# Patient Record
Sex: MALE | Race: ASIAN | NOT HISPANIC OR LATINO | Employment: FULL TIME | URBAN - METROPOLITAN AREA
[De-identification: names, ages, dates, MRNs, and addresses within clinical notes are randomized per-mention and may not be internally consistent; named-entity substitution may affect disease eponyms.]

---

## 2017-11-22 ENCOUNTER — OFFICE VISIT (OUTPATIENT)
Dept: INTERNAL MEDICINE | Facility: MEDICAL CENTER | Age: 27
End: 2017-11-22
Payer: COMMERCIAL

## 2017-11-22 VITALS
OXYGEN SATURATION: 96 % | HEART RATE: 72 BPM | SYSTOLIC BLOOD PRESSURE: 135 MMHG | BODY MASS INDEX: 21.94 KG/M2 | WEIGHT: 162 LBS | HEIGHT: 72 IN | DIASTOLIC BLOOD PRESSURE: 77 MMHG | TEMPERATURE: 98.4 F | RESPIRATION RATE: 16 BRPM

## 2017-11-22 DIAGNOSIS — Z00.00 HEALTHCARE MAINTENANCE: ICD-10-CM

## 2017-11-22 DIAGNOSIS — M94.0 COSTOCHONDRITIS: ICD-10-CM

## 2017-11-22 PROCEDURE — 99204 OFFICE O/P NEW MOD 45 MIN: CPT | Mod: GC | Performed by: INTERNAL MEDICINE

## 2017-11-22 ASSESSMENT — PATIENT HEALTH QUESTIONNAIRE - PHQ9: CLINICAL INTERPRETATION OF PHQ2 SCORE: 0

## 2017-11-22 NOTE — PATIENT INSTRUCTIONS
- please start using the gel topically   - please use a heat packs     Costochondritis  Costochondritis, sometimes called Tietze syndrome, is a swelling and irritation (inflammation) of the tissue (cartilage) that connects your ribs with your breastbone (sternum). It causes pain in the chest and rib area. Costochondritis usually goes away on its own over time. It can take up to 6 weeks or longer to get better, especially if you are unable to limit your activities.  CAUSES   Some cases of costochondritis have no known cause. Possible causes include:  · Injury (trauma).  · Exercise or activity such as lifting.  · Severe coughing.  SIGNS AND SYMPTOMS  · Pain and tenderness in the chest and rib area.  · Pain that gets worse when coughing or taking deep breaths.  · Pain that gets worse with specific movements.  DIAGNOSIS   Your health care provider will do a physical exam and ask about your symptoms. Chest X-rays or other tests may be done to rule out other problems.  TREATMENT   Costochondritis usually goes away on its own over time. Your health care provider may prescribe medicine to help relieve pain.  HOME CARE INSTRUCTIONS   · Avoid exhausting physical activity. Try not to strain your ribs during normal activity. This would include any activities using chest, abdominal, and side muscles, especially if heavy weights are used.  · Apply ice to the affected area for the first 2 days after the pain begins.  ¨ Put ice in a plastic bag.  ¨ Place a towel between your skin and the bag.  ¨ Leave the ice on for 20 minutes, 2-3 times a day.  · Only take over-the-counter or prescription medicines as directed by your health care provider.  SEEK MEDICAL CARE IF:  · You have redness or swelling at the rib joints. These are signs of infection.  · Your pain does not go away despite rest or medicine.  SEEK IMMEDIATE MEDICAL CARE IF:   · Your pain increases or you are very uncomfortable.  · You have shortness of breath or difficulty  breathing.  · You cough up blood.  · You have worse chest pains, sweating, or vomiting.  · You have a fever or persistent symptoms for more than 2-3 days.  · You have a fever and your symptoms suddenly get worse.  MAKE SURE YOU:   · Understand these instructions.  · Will watch your condition.  · Will get help right away if you are not doing well or get worse.     This information is not intended to replace advice given to you by your health care provider. Make sure you discuss any questions you have with your health care provider.     Document Released: 09/27/2006 Document Revised: 10/08/2014 Document Reviewed: 07/22/2014  [employer] Interactive Patient Education ©2016 [employer]

## 2017-11-22 NOTE — PROGRESS NOTES
New Patient to Establish    Reason to establish: New patient to establish    CC: New patient establishment and chest pain    HPI: Mr. Pelayo is a pleasant 27 years old male with no past medical history presented to the clinic today to establish care as a new patient and complaining of chest pain for 3 months, pain felt as a sharp pain, over the central chest, increased with movement and physical activity ( patient plays soccer, basketball and wrestling), relieved with naproxen and rest, reproducible by pressure over the left side of the chest and the specific movements of the left arm, he was seen by an orthopedic doctor in [facility], had a normal chest x-ray, was prescribed naproxen 500 mg twice daily and advised to rest, patient pain resolved until 2 weeks ago when he resumed his physical activity.    Screening:  - HIV screening:  Was never done    - flu vaccine: not given   - Tdap; 3 years ago   - Basic Labs within the last 12 months:  Not on file   - Vit D: not on file          There are no active problems to display for this patient.      History reviewed. No pertinent past medical history.    Current Outpatient Prescriptions   Medication Sig Dispense Refill   • diclofenac (SOLARAZE) 3 % gel Apply to the chest at areas of pain 2 times daily 50 g 3     No current facility-administered medications for this visit.        Allergies as of 11/22/2017   • (No Known Allergies)       Social History     Social History   • Marital status: Single     Spouse name: N/A   • Number of children: N/A   • Years of education: N/A     Occupational History   • Not on file.     Social History Main Topics   • Smoking status: Never Smoker   • Smokeless tobacco: Never Used   • Alcohol use No   • Drug use: No   • Sexual activity: Not on file     Other Topics Concern   • Not on file     Social History Narrative   • No narrative on file       History reviewed. No pertinent family history.    History reviewed. No pertinent surgical  "history.    ROS: As per HPI. Additional pertinent symptoms as noted below.  Constitutional: Negative for fever, chills, weight loss, malaise/fatigue and diaphoresis.   HENT: Negative for ear discharge, ear pain, hearing loss and tinnitus.    Eyes: Negative for blurred vision, double vision, pain, discharge and redness.   Respiratory: Negative for cough, hemoptysis, sputum production, shortness of breath and wheezing.    Cardiovascular: Positive for chest pain, Negative for palpitations, orthopnea, leg swelling and PND.   Gastrointestinal: Negative for heartburn, nausea, vomiting, abdominal pain, diarrhea, constipation and blood in stool.   Genitourinary: Negative for dysuria, urgency, frequency, hematuria and flank pain.   Musculoskeletal: Negative for myalgias, back pain, joint pain and neck pain.   Skin: Negative for itching and rash.   Neurological: Negative for dizziness, tingling, tremors, sensory change, focal weakness, loss of consciousness, weakness and headaches.   Psychiatric/Behavioral: Negative for depression and suicidal ideas. The patient is not nervous/anxious and does not have insomnia.        /77   Pulse 72   Temp 36.9 °C (98.4 °F)   Resp 16   Ht 1.83 m (6' 0.05\")   Wt 73.5 kg (162 lb)   SpO2 96%   BMI 21.94 kg/m²     Physical Exam  Constitutional: Oriented to person, place, and time and well-developed, well-nourished, and in no distress. Vital signs are normal.   HENT:   Head: Normocephalic and atraumatic.   Mouth/Throat: Oropharynx is clear and moist.   Eyes: Conjunctivae are normal.   Neck: Neck supple. No JVD present. No tracheal deviation present.   Cardiovascular: Normal rate.  Exam reveals no gallop and no friction rub.    No murmur heard.  Pulmonary/Chest: Effort normal and breath sounds normal. No respiratory distress. he has no wheezes. he has no rales. he exhibits tenderness over the sternum and Lt costochondral joints.   Abdominal: Soft. Bowel sounds are normal. he exhibits " no mass. There is no tenderness. There is no rebound and no guarding.   Musculoskeletal: Normal range of motion. he exhibits no edema.   Lymphadenopathy:     he has no cervical adenopathy.   Neurological: he is alert and oriented to person, place, and time. he has normal strength and normal reflexes. Gait normal. GCS score is 15.   Skin: No rash noted. No erythema. No pallor.       Assessment and Plan    1. Healthcare maintenance  New patient presented to the clinic to establish care, patient was not following With a PCP.  - Colon cancer colonoscopy screening: Recommended that the age of 50 years old  - Prostate cancer screening: We'll discuss with the patient when he is 51 years old  - Lung cancer screening: non smoker no screening indicated  - AAA screening:non smoker no screening indicated  - HIV screening: ordered this visit    - flu vaccine: patient refused   - Tdap: due in 2024  - PNA vac: Normal risk recommended at 65 years old  - Basic Labs within the last 12 months: ordered today   - Vit D: ordered today   - Shingles vac: Recommended at 50 years old        2. Costochondritis  27 years old male complaining of chest pain for 3 months, pain felt as a sharp pain, over the central chest, increased with movement and physical activity ( patient plays soccer, basketball and wrestling), relieved with naproxen and rest, reproducible by pressure over the left side of the chest and the specific movements of the left arm.   On examination he exhibits tenderness over the sternum and Lt costochondral joints.   Plan:   - Diclofenac gel BID topical   - request medical records from [facility]  -Follow up in 3 weeks    Followup: Return in about 3 weeks (around 12/13/2017).       Signed by: Nat Pritchett M.D.

## 2017-11-27 ENCOUNTER — HOSPITAL ENCOUNTER (OUTPATIENT)
Dept: LAB | Facility: MEDICAL CENTER | Age: 27
End: 2017-11-27
Attending: STUDENT IN AN ORGANIZED HEALTH CARE EDUCATION/TRAINING PROGRAM
Payer: COMMERCIAL

## 2017-11-27 DIAGNOSIS — Z00.00 HEALTHCARE MAINTENANCE: ICD-10-CM

## 2017-11-27 LAB
25(OH)D3 SERPL-MCNC: 22 NG/ML (ref 30–100)
ALBUMIN SERPL BCP-MCNC: 4.6 G/DL (ref 3.2–4.9)
ALBUMIN/GLOB SERPL: 1.8 G/DL
ALP SERPL-CCNC: 49 U/L (ref 30–99)
ALT SERPL-CCNC: 14 U/L (ref 2–50)
ANION GAP SERPL CALC-SCNC: 6 MMOL/L (ref 0–11.9)
AST SERPL-CCNC: 18 U/L (ref 12–45)
BASOPHILS # BLD AUTO: 0.9 % (ref 0–1.8)
BASOPHILS # BLD: 0.04 K/UL (ref 0–0.12)
BILIRUB SERPL-MCNC: 1.1 MG/DL (ref 0.1–1.5)
BUN SERPL-MCNC: 13 MG/DL (ref 8–22)
CALCIUM SERPL-MCNC: 9.8 MG/DL (ref 8.5–10.5)
CHLORIDE SERPL-SCNC: 104 MMOL/L (ref 96–112)
CHOLEST SERPL-MCNC: 151 MG/DL (ref 100–199)
CO2 SERPL-SCNC: 28 MMOL/L (ref 20–33)
CREAT SERPL-MCNC: 1.01 MG/DL (ref 0.5–1.4)
EOSINOPHIL # BLD AUTO: 0.07 K/UL (ref 0–0.51)
EOSINOPHIL NFR BLD: 1.6 % (ref 0–6.9)
ERYTHROCYTE [DISTWIDTH] IN BLOOD BY AUTOMATED COUNT: 39 FL (ref 35.9–50)
GFR SERPL CREATININE-BSD FRML MDRD: >60 ML/MIN/1.73 M 2
GLOBULIN SER CALC-MCNC: 2.6 G/DL (ref 1.9–3.5)
GLUCOSE SERPL-MCNC: 82 MG/DL (ref 65–99)
HCT VFR BLD AUTO: 45.3 % (ref 42–52)
HDLC SERPL-MCNC: 42 MG/DL
HGB BLD-MCNC: 15.7 G/DL (ref 14–18)
HIV 1+2 AB+HIV1 P24 AG SERPL QL IA: NON REACTIVE
IMM GRANULOCYTES # BLD AUTO: 0 K/UL (ref 0–0.11)
IMM GRANULOCYTES NFR BLD AUTO: 0 % (ref 0–0.9)
LDLC SERPL CALC-MCNC: 97 MG/DL
LYMPHOCYTES # BLD AUTO: 1.93 K/UL (ref 1–4.8)
LYMPHOCYTES NFR BLD: 43.2 % (ref 22–41)
MCH RBC QN AUTO: 30.1 PG (ref 27–33)
MCHC RBC AUTO-ENTMCNC: 34.7 G/DL (ref 33.7–35.3)
MCV RBC AUTO: 86.8 FL (ref 81.4–97.8)
MONOCYTES # BLD AUTO: 0.4 K/UL (ref 0–0.85)
MONOCYTES NFR BLD AUTO: 8.9 % (ref 0–13.4)
NEUTROPHILS # BLD AUTO: 2.03 K/UL (ref 1.82–7.42)
NEUTROPHILS NFR BLD: 45.4 % (ref 44–72)
NRBC # BLD AUTO: 0 K/UL
NRBC BLD AUTO-RTO: 0 /100 WBC
PLATELET # BLD AUTO: 202 K/UL (ref 164–446)
PMV BLD AUTO: 10.5 FL (ref 9–12.9)
POTASSIUM SERPL-SCNC: 4 MMOL/L (ref 3.6–5.5)
PROT SERPL-MCNC: 7.2 G/DL (ref 6–8.2)
RBC # BLD AUTO: 5.22 M/UL (ref 4.7–6.1)
SODIUM SERPL-SCNC: 138 MMOL/L (ref 135–145)
TRIGL SERPL-MCNC: 61 MG/DL (ref 0–149)
TSH SERPL DL<=0.005 MIU/L-ACNC: 1.27 UIU/ML (ref 0.3–3.7)
WBC # BLD AUTO: 4.5 K/UL (ref 4.8–10.8)

## 2017-11-27 PROCEDURE — 83036 HEMOGLOBIN GLYCOSYLATED A1C: CPT

## 2017-11-27 PROCEDURE — 80061 LIPID PANEL: CPT

## 2017-11-27 PROCEDURE — 82306 VITAMIN D 25 HYDROXY: CPT

## 2017-11-27 PROCEDURE — 85025 COMPLETE CBC W/AUTO DIFF WBC: CPT

## 2017-11-27 PROCEDURE — 87389 HIV-1 AG W/HIV-1&-2 AB AG IA: CPT

## 2017-11-27 PROCEDURE — 36415 COLL VENOUS BLD VENIPUNCTURE: CPT

## 2017-11-27 PROCEDURE — 84443 ASSAY THYROID STIM HORMONE: CPT

## 2017-11-27 PROCEDURE — 80053 COMPREHEN METABOLIC PANEL: CPT

## 2017-11-28 DIAGNOSIS — E55.9 VITAMIN D DEFICIENCY: ICD-10-CM

## 2017-11-28 LAB
EST. AVERAGE GLUCOSE BLD GHB EST-MCNC: 108 MG/DL
HBA1C MFR BLD: 5.4 % (ref 0–5.6)

## 2017-11-28 RX ORDER — ERGOCALCIFEROL 1.25 MG/1
50000 CAPSULE ORAL
Qty: 16 CAP | Refills: 0 | Status: SHIPPED | OUTPATIENT
Start: 2017-11-28 | End: 2020-01-27

## 2017-12-04 ENCOUNTER — TELEPHONE (OUTPATIENT)
Dept: INTERNAL MEDICINE | Facility: MEDICAL CENTER | Age: 27
End: 2017-12-04

## 2017-12-04 DIAGNOSIS — M54.5 LOW BACK PAIN, UNSPECIFIED BACK PAIN LATERALITY, UNSPECIFIED CHRONICITY, WITH SCIATICA PRESENCE UNSPECIFIED: ICD-10-CM

## 2017-12-04 NOTE — TELEPHONE ENCOUNTER
Patient requires PA for the diclofenac gel but I see a 3 % gel was prescribed. Can you prescribe a 1% gel? Usually the 3% gel is way more expensive and doesn't get covered by insurances. Prescribe a lower percentage would give a chance for patient's to get an approval.

## 2017-12-04 NOTE — TELEPHONE ENCOUNTER
----- Message from Rin Cesar sent at 12/1/2017  4:26 PM PST -----  Regarding: Dr Kimble/prior auth for rx  Contact: [phone]  Patient needs a prior auth diclofenacor  3% gel

## 2017-12-12 RX ORDER — MELOXICAM 7.5 MG/1
7.5 TABLET ORAL 2 TIMES DAILY PRN
Qty: 60 TAB | Refills: 2 | Status: SHIPPED | OUTPATIENT
Start: 2017-12-12 | End: 2018-01-11

## 2017-12-12 NOTE — TELEPHONE ENCOUNTER
Called pharmacy to verify if meloxicam was covered insurance will only pay for pt to take 1 tab daily verbal ok from  to change to a 15mg tab take 1/2 tab po twice daily gave verbal to pharmacist please change in pt's chart      Pt notified of new medication sent to pharmacy

## 2017-12-12 NOTE — TELEPHONE ENCOUNTER
Pt calling to check on the status of diclofenac has been waiting for 3 weeks. In order for diclofenac to be covered pt needs to try and fail 2 alternatives meloxicam,naproxen or piroxicam please send rx to pharmacy if appropriate or call pt to discuss

## 2018-01-15 ENCOUNTER — OFFICE VISIT (OUTPATIENT)
Dept: INTERNAL MEDICINE | Facility: MEDICAL CENTER | Age: 28
End: 2018-01-15
Payer: COMMERCIAL

## 2018-01-15 VITALS
OXYGEN SATURATION: 97 % | HEART RATE: 70 BPM | BODY MASS INDEX: 21.75 KG/M2 | DIASTOLIC BLOOD PRESSURE: 79 MMHG | HEIGHT: 72 IN | WEIGHT: 160.6 LBS | SYSTOLIC BLOOD PRESSURE: 122 MMHG | TEMPERATURE: 98.2 F

## 2018-01-15 DIAGNOSIS — R07.89 COSTOCHONDRAL CHEST PAIN: ICD-10-CM

## 2018-01-15 PROCEDURE — 99214 OFFICE O/P EST MOD 30 MIN: CPT | Mod: GC | Performed by: INTERNAL MEDICINE

## 2018-01-15 RX ORDER — NAPROXEN 500 MG/1
500 TABLET ORAL 2 TIMES DAILY WITH MEALS
Qty: 60 TAB | Refills: 0 | Status: SHIPPED | OUTPATIENT
Start: 2018-01-15 | End: 2020-01-27

## 2018-01-15 ASSESSMENT — PAIN SCALES - GENERAL: PAINLEVEL: 7=MODERATE-SEVERE PAIN

## 2018-01-15 NOTE — LETTER
[facility]  Nat Pritchett M.D.  [address]  [place] [address]  Fax: [phone]   Authorization for Release/Disclosure of   Protected Health Information   Name: MELINA PELAYO : 1990 SSN: [id number]   Address: [address]  [address]  [place] [address] Phone:    [phone] (home)    I authorize the entity listed below to release/disclose the PHI below to:   [facility]/Nat Pritchett M.D. and Nat Pritchett M.D.   Provider or Entity Name:     Address   City, State, Zip   Phone:      Fax:     Reason for request: continuity of care   Information to be released:    [  ] LAST COLONOSCOPY,  including any PATH REPORT and follow-up  [  ] LAST FIT/COLOGUARD RESULT [  ] LAST DEXA  [  ] LAST MAMMOGRAM  [  ] LAST PAP  [  ] LAST LABS [  ] RETINA EXAM REPORT  [  ] IMMUNIZATION RECORDS  [  ] Release all info      [  ] Check here and initial the line next to each item to release ALL health information INCLUDING  _____ Care and treatment for drug and / or alcohol abuse  _____ HIV testing, infection status, or AIDS  _____ Genetic Testing    DATES OF SERVICE OR TIME PERIOD TO BE DISCLOSED: _____________  I understand and acknowledge that:  * This Authorization may be revoked at any time by you in writing, except if your health information has already been used or disclosed.  * Your health information that will be used or disclosed as a result of you signing this authorization could be re-disclosed by the recipient. If this occurs, your re-disclosed health information may no longer be protected by State or Federal laws.  * You may refuse to sign this Authorization. Your refusal will not affect your ability to obtain treatment.  * This Authorization becomes effective upon signing and will  on (date) __________.      If no date is indicated, this Authorization will  one (1) year from the signature date.    Name: Melina Pelayo    Signature:   Date:     1/15/2018       PLEASE  FAX REQUESTED RECORDS BACK TO: [phone]

## 2018-01-15 NOTE — PROGRESS NOTES
Established Patient    Trung presents today with the following:    CC: Follow-up chest pain    HPI: Mr. Stack is less than 27 years old male with medical history of vitamin D deficiency presented to the clinic today to follow up for his chest pain, chest pain started about 4 months ago, pain was sharp, over the central chest, increased with movement and physical activity, reproducible by pressure over the left side of the chest and the specific movements of the left arm, patient was prescribed olfen gel, he used us for 4 weeks, he report improvement in pain, he does not feel the sharp pain when he sneezes anymore but he still has the pain and its being more as low-grade constant pain.     Patient Active Problem List    Diagnosis Date Noted   • Vitamin D deficiency 11/28/2017       Current Outpatient Prescriptions   Medication Sig Dispense Refill   • naproxen (NAPROSYN) 500 MG Tab Take 1 Tab by mouth 2 times a day, with meals. 60 Tab 0   • vitamin D, Ergocalciferol, (DRISDOL) 41555 units Cap capsule Take 1 Cap by mouth every 7 days. 16 Cap 0     No current facility-administered medications for this visit.        ROS: As per HPI. Additional pertinent symptoms as noted below.  Constitutional: Negative for fever, chills, weight loss, malaise/fatigue and diaphoresis.   HENT: Negative for ear discharge, ear pain, hearing loss and tinnitus.    Eyes: Negative for blurred vision, double vision, pain, discharge and redness.   Respiratory: Negative for cough, hemoptysis, sputum production, shortness of breath and wheezing.    Cardiovascular: Negative for palpitations, orthopnea, leg swelling and PND.  positive for chest pain  Gastrointestinal: Negative for heartburn, nausea, vomiting, abdominal pain, diarrhea, constipation and blood in stool.   Genitourinary: Negative for dysuria, urgency, frequency, hematuria and flank pain.   Musculoskeletal: Negative for myalgias, back pain, joint pain and neck pain.   Skin: Negative for  itching and rash.   Neurological: Negative for dizziness, tingling, tremors, sensory change, focal weakness, loss of consciousness, weakness and headaches.   Psychiatric/Behavioral: Negative for depression and suicidal ideas. The patient is not nervous/anxious and does not have insomnia.        /79   Pulse 70   Temp 36.8 °C (98.2 °F)   Ht 1.829 m (6')   Wt 72.8 kg (160 lb 9.6 oz)   SpO2 97%   BMI 21.78 kg/m²     Physical Exam   Constitutional: Oriented to person, place, and time and well-developed, well-nourished, and in no distress. Vital signs are normal.   HENT:   Head: Normocephalic and atraumatic.   Mouth/Throat: Oropharynx is clear and moist.   Eyes: Conjunctivae are normal.   Neck: Neck supple. No JVD present. No tracheal deviation present.   Cardiovascular: Normal rate.  Exam reveals no gallop and no friction rub.    No murmur heard.  Pulmonary/Chest: Effort normal and breath sounds normal. No respiratory distress. he has no wheezes. he has no rales. he exhibits tenderness over the upper costovertebral joints, more on the right side.   Abdominal: Soft. Bowel sounds are normal. he exhibits no mass. There is no tenderness. There is no rebound and no guarding.   Musculoskeletal: Normal range of motion. he exhibits no edema.   Lymphadenopathy:     he has no cervical adenopathy.   Neurological: he is alert and oriented to person, place, and time. he has normal strength . Gait normal.    Skin: No rash noted. No erythema. No pallor.   Assessment and Plan    1. Costochondral chest pain  27 years old male with central chest pain for 4 months, pain was sharp, over the central chest, increased with movement and physical activity, reproducible by pressure over the left side of the chest and the specific movements of the left arm, patient was prescribed olfen gel, he used us for 4 weeks, he report improvement in pain, he does not feel the sharp pain when he neezes but he still has the pain and its being more  as low-grade constant pain.   - He will seen by [facility] in September, had a normal chest x-ray  Plan:   - Start naproxen 500 mg twice a day   - Follow-up in 3 weeks  - Will assess the patient's response to systemic NSAIDs, patient will most likely costochondritis given his physical examination and partial response to topical Olfen gel, he has minor pectus deformity which might be the reason for the pain.      Signed by: Nat Pritchett M.D.

## 2018-01-15 NOTE — PATIENT INSTRUCTIONS
- please start taking naproxen   - follow up in 3 weeks   - start taking vit D     Costochondritis  Costochondritis, sometimes called Tietze syndrome, is a swelling and irritation (inflammation) of the tissue (cartilage) that connects your ribs with your breastbone (sternum). It causes pain in the chest and rib area. Costochondritis usually goes away on its own over time. It can take up to 6 weeks or longer to get better, especially if you are unable to limit your activities.  CAUSES   Some cases of costochondritis have no known cause. Possible causes include:  · Injury (trauma).  · Exercise or activity such as lifting.  · Severe coughing.  SIGNS AND SYMPTOMS  · Pain and tenderness in the chest and rib area.  · Pain that gets worse when coughing or taking deep breaths.  · Pain that gets worse with specific movements.  DIAGNOSIS   Your health care provider will do a physical exam and ask about your symptoms. Chest X-rays or other tests may be done to rule out other problems.  TREATMENT   Costochondritis usually goes away on its own over time. Your health care provider may prescribe medicine to help relieve pain.  HOME CARE INSTRUCTIONS   · Avoid exhausting physical activity. Try not to strain your ribs during normal activity. This would include any activities using chest, abdominal, and side muscles, especially if heavy weights are used.  · Apply ice to the affected area for the first 2 days after the pain begins.  ¨ Put ice in a plastic bag.  ¨ Place a towel between your skin and the bag.  ¨ Leave the ice on for 20 minutes, 2-3 times a day.  · Only take over-the-counter or prescription medicines as directed by your health care provider.  SEEK MEDICAL CARE IF:  · You have redness or swelling at the rib joints. These are signs of infection.  · Your pain does not go away despite rest or medicine.  SEEK IMMEDIATE MEDICAL CARE IF:   · Your pain increases or you are very uncomfortable.  · You have shortness of breath or  difficulty breathing.  · You cough up blood.  · You have worse chest pains, sweating, or vomiting.  · You have a fever or persistent symptoms for more than 2-3 days.  · You have a fever and your symptoms suddenly get worse.  MAKE SURE YOU:   · Understand these instructions.  · Will watch your condition.  · Will get help right away if you are not doing well or get worse.     This information is not intended to replace advice given to you by your health care provider. Make sure you discuss any questions you have with your health care provider.     Document Released: 09/27/2006 Document Revised: 10/08/2014 Document Reviewed: 07/22/2014  [employer] Interactive Patient Education ©2016 [employer]

## 2019-12-11 ENCOUNTER — HOSPITAL ENCOUNTER (OUTPATIENT)
Dept: LAB | Facility: MEDICAL CENTER | Age: 29
End: 2019-12-11
Attending: STUDENT IN AN ORGANIZED HEALTH CARE EDUCATION/TRAINING PROGRAM
Payer: COMMERCIAL

## 2019-12-11 LAB
ALBUMIN SERPL BCP-MCNC: 4.7 G/DL (ref 3.2–4.9)
ALBUMIN/GLOB SERPL: 2 G/DL
ALP SERPL-CCNC: 50 U/L (ref 30–99)
ALT SERPL-CCNC: 15 U/L (ref 2–50)
ANION GAP SERPL CALC-SCNC: 8 MMOL/L (ref 0–11.9)
AST SERPL-CCNC: 20 U/L (ref 12–45)
BASOPHILS # BLD AUTO: 1 % (ref 0–1.8)
BASOPHILS # BLD: 0.04 K/UL (ref 0–0.12)
BILIRUB SERPL-MCNC: 0.7 MG/DL (ref 0.1–1.5)
BUN SERPL-MCNC: 9 MG/DL (ref 8–22)
CALCIUM SERPL-MCNC: 8.9 MG/DL (ref 8.5–10.5)
CHLORIDE SERPL-SCNC: 102 MMOL/L (ref 96–112)
CHOLEST SERPL-MCNC: 134 MG/DL (ref 100–199)
CO2 SERPL-SCNC: 29 MMOL/L (ref 20–33)
CREAT SERPL-MCNC: 1.23 MG/DL (ref 0.5–1.4)
EOSINOPHIL # BLD AUTO: 0.06 K/UL (ref 0–0.51)
EOSINOPHIL NFR BLD: 1.4 % (ref 0–6.9)
ERYTHROCYTE [DISTWIDTH] IN BLOOD BY AUTOMATED COUNT: 41.4 FL (ref 35.9–50)
FASTING STATUS PATIENT QL REPORTED: NORMAL
GLOBULIN SER CALC-MCNC: 2.3 G/DL (ref 1.9–3.5)
GLUCOSE SERPL-MCNC: 92 MG/DL (ref 65–99)
HCT VFR BLD AUTO: 48 % (ref 42–52)
HDLC SERPL-MCNC: 42 MG/DL
HGB BLD-MCNC: 15.7 G/DL (ref 14–18)
IMM GRANULOCYTES # BLD AUTO: 0.01 K/UL (ref 0–0.11)
IMM GRANULOCYTES NFR BLD AUTO: 0.2 % (ref 0–0.9)
LDLC SERPL CALC-MCNC: 76 MG/DL
LYMPHOCYTES # BLD AUTO: 1.83 K/UL (ref 1–4.8)
LYMPHOCYTES NFR BLD: 43.8 % (ref 22–41)
MCH RBC QN AUTO: 30 PG (ref 27–33)
MCHC RBC AUTO-ENTMCNC: 32.7 G/DL (ref 33.7–35.3)
MCV RBC AUTO: 91.6 FL (ref 81.4–97.8)
MONOCYTES # BLD AUTO: 0.45 K/UL (ref 0–0.85)
MONOCYTES NFR BLD AUTO: 10.8 % (ref 0–13.4)
NEUTROPHILS # BLD AUTO: 1.79 K/UL (ref 1.82–7.42)
NEUTROPHILS NFR BLD: 42.8 % (ref 44–72)
NRBC # BLD AUTO: 0 K/UL
NRBC BLD-RTO: 0 /100 WBC
PLATELET # BLD AUTO: 198 K/UL (ref 164–446)
PMV BLD AUTO: 10.4 FL (ref 9–12.9)
POTASSIUM SERPL-SCNC: 4.2 MMOL/L (ref 3.6–5.5)
PROT SERPL-MCNC: 7 G/DL (ref 6–8.2)
RBC # BLD AUTO: 5.24 M/UL (ref 4.7–6.1)
SODIUM SERPL-SCNC: 139 MMOL/L (ref 135–145)
TRIGL SERPL-MCNC: 82 MG/DL (ref 0–149)
WBC # BLD AUTO: 4.2 K/UL (ref 4.8–10.8)

## 2019-12-11 PROCEDURE — 80053 COMPREHEN METABOLIC PANEL: CPT

## 2019-12-11 PROCEDURE — 36415 COLL VENOUS BLD VENIPUNCTURE: CPT

## 2019-12-11 PROCEDURE — 83036 HEMOGLOBIN GLYCOSYLATED A1C: CPT

## 2019-12-11 PROCEDURE — 80061 LIPID PANEL: CPT

## 2019-12-11 PROCEDURE — 85025 COMPLETE CBC W/AUTO DIFF WBC: CPT

## 2019-12-12 LAB
EST. AVERAGE GLUCOSE BLD GHB EST-MCNC: 111 MG/DL
HBA1C MFR BLD: 5.5 % (ref 0–5.6)

## 2020-01-27 ENCOUNTER — OFFICE VISIT (OUTPATIENT)
Dept: URGENT CARE | Facility: MEDICAL CENTER | Age: 30
End: 2020-01-27
Payer: COMMERCIAL

## 2020-01-27 ENCOUNTER — HOSPITAL ENCOUNTER (OUTPATIENT)
Facility: MEDICAL CENTER | Age: 30
End: 2020-01-27
Attending: NURSE PRACTITIONER
Payer: COMMERCIAL

## 2020-01-27 VITALS
TEMPERATURE: 99.3 F | OXYGEN SATURATION: 98 % | RESPIRATION RATE: 16 BRPM | HEIGHT: 73 IN | SYSTOLIC BLOOD PRESSURE: 136 MMHG | BODY MASS INDEX: 23.19 KG/M2 | WEIGHT: 175 LBS | HEART RATE: 86 BPM | DIASTOLIC BLOOD PRESSURE: 72 MMHG

## 2020-01-27 DIAGNOSIS — N50.89 SCROTUM SWELLING: ICD-10-CM

## 2020-01-27 DIAGNOSIS — Z20.2 EXPOSURE TO STD: ICD-10-CM

## 2020-01-27 DIAGNOSIS — Z11.3 SCREEN FOR STD (SEXUALLY TRANSMITTED DISEASE): ICD-10-CM

## 2020-01-27 DIAGNOSIS — N45.1 EPIDIDYMITIS: ICD-10-CM

## 2020-01-27 LAB
APPEARANCE UR: CLEAR
BILIRUB UR STRIP-MCNC: NEGATIVE MG/DL
COLOR UR AUTO: YELLOW
GLUCOSE UR STRIP.AUTO-MCNC: NEGATIVE MG/DL
KETONES UR STRIP.AUTO-MCNC: NEGATIVE MG/DL
LEUKOCYTE ESTERASE UR QL STRIP.AUTO: NORMAL
NITRITE UR QL STRIP.AUTO: NEGATIVE
PH UR STRIP.AUTO: 6 [PH] (ref 5–8)
PROT UR QL STRIP: NEGATIVE MG/DL
RBC UR QL AUTO: NORMAL
SP GR UR STRIP.AUTO: 1.01
UROBILINOGEN UR STRIP-MCNC: 0.2 MG/DL

## 2020-01-27 PROCEDURE — 87491 CHLMYD TRACH DNA AMP PROBE: CPT

## 2020-01-27 PROCEDURE — 87591 N.GONORRHOEAE DNA AMP PROB: CPT

## 2020-01-27 PROCEDURE — 81002 URINALYSIS NONAUTO W/O SCOPE: CPT | Performed by: NURSE PRACTITIONER

## 2020-01-27 PROCEDURE — 99214 OFFICE O/P EST MOD 30 MIN: CPT | Performed by: NURSE PRACTITIONER

## 2020-01-27 RX ORDER — AZITHROMYCIN 500 MG/1
1000 TABLET, FILM COATED ORAL ONCE
Qty: 2 TAB | Refills: 0 | Status: SHIPPED | OUTPATIENT
Start: 2020-01-27 | End: 2020-01-27

## 2020-01-27 RX ORDER — DOXYCYCLINE HYCLATE 100 MG/1
100 CAPSULE ORAL 2 TIMES DAILY
Qty: 20 CAP | Refills: 0 | Status: SHIPPED | OUTPATIENT
Start: 2020-01-27 | End: 2020-02-06

## 2020-01-27 ASSESSMENT — ENCOUNTER SYMPTOMS
FEVER: 0
VOMITING: 0
CHILLS: 0
NAUSEA: 0
CONSTIPATION: 0
HEADACHES: 0
ABDOMINAL PAIN: 0
DIARRHEA: 0
TINGLING: 0

## 2020-01-27 ASSESSMENT — LIFESTYLE VARIABLES: SUBSTANCE_ABUSE: 0

## 2020-01-28 ENCOUNTER — HOSPITAL ENCOUNTER (OUTPATIENT)
Dept: RADIOLOGY | Facility: MEDICAL CENTER | Age: 30
End: 2020-01-28
Attending: NURSE PRACTITIONER
Payer: COMMERCIAL

## 2020-01-28 ENCOUNTER — TELEPHONE (OUTPATIENT)
Dept: URGENT CARE | Facility: PHYSICIAN GROUP | Age: 30
End: 2020-01-28

## 2020-01-28 DIAGNOSIS — Z20.2 EXPOSURE TO STD: ICD-10-CM

## 2020-01-28 DIAGNOSIS — N45.1 EPIDIDYMITIS: ICD-10-CM

## 2020-01-28 DIAGNOSIS — Z11.3 SCREEN FOR STD (SEXUALLY TRANSMITTED DISEASE): ICD-10-CM

## 2020-01-28 DIAGNOSIS — N50.89 SCROTUM SWELLING: ICD-10-CM

## 2020-01-28 LAB
C TRACH DNA SPEC QL NAA+PROBE: POSITIVE
N GONORRHOEA DNA SPEC QL NAA+PROBE: NEGATIVE
SPECIMEN SOURCE: ABNORMAL

## 2020-01-28 PROCEDURE — 76870 US EXAM SCROTUM: CPT

## 2020-01-28 ASSESSMENT — ENCOUNTER SYMPTOMS
FLANK PAIN: 0
BACK PAIN: 0

## 2020-01-28 NOTE — PROGRESS NOTES
"Subjective:      Trung Pelayo is a 29 y.o. male who presents with Exposure to STD (swelling in genital area this morning, exposure to herpes and chlamydia)    Reviewed past medical, surgical and family history. Reviewed prescription and OTC medications with patient in electronic health record today      No Known Allergies          Exposure to STD   This is a new problem. The current episode started in the past 7 days. The problem occurs constantly. Associated symptoms include urinary symptoms. Pertinent negatives include no abdominal pain, chills, fever, headaches, nausea, rash or vomiting. Diaphoresis:    Associated symptoms comments: Left testicular pain and swelling  . Nothing aggravates the symptoms. He has tried acetaminophen for the symptoms. The treatment provided mild relief.       Review of Systems   Constitutional: Negative for chills and fever. Diaphoresis:      Gastrointestinal: Negative for abdominal pain, constipation, diarrhea, nausea and vomiting.   Genitourinary: Negative for dysuria, flank pain, frequency, hematuria and urgency.   Musculoskeletal: Negative for back pain.   Skin: Negative for rash.   Neurological: Negative for tingling and headaches.   Psychiatric/Behavioral: Negative for substance abuse.          Objective:     /72   Pulse 86   Temp 37.4 °C (99.3 °F) (Temporal)   Resp 16   Ht 1.854 m (6' 1\")   Wt 79.4 kg (175 lb)   SpO2 98%   BMI 23.09 kg/m²      Physical Exam  Vitals signs and nursing note reviewed.   Constitutional:       Appearance: Normal appearance. He is well-developed.   HENT:      Head: Normocephalic.   Cardiovascular:      Rate and Rhythm: Normal rate.      Pulses: Normal pulses.   Pulmonary:      Effort: Pulmonary effort is normal.   Abdominal:      Palpations: Abdomen is not rigid.   Genitourinary:     Penis: Normal and uncircumcised.       Scrotum/Testes:         Left: Tenderness, swelling, testicular hydrocele or varicocele not present.      Epididymis: "      Left: Enlarged. Not inflamed. Tenderness present. No mass.   Skin:     General: Skin is warm and dry.   Neurological:      Mental Status: He is alert and oriented to person, place, and time.   Psychiatric:         Mood and Affect: Mood normal.         Behavior: Behavior normal.         Thought Content: Thought content normal.                 Assessment/Plan:     1. Screen for STD (sexually transmitted disease)  POCT Urinalysis    [id number]    CHLAMYDIA/GC PCR URINE OR SWAB   2. Epididymitis  doxycycline (VIBRAMYCIN) 100 MG Cap    [id number]   3. Exposure to STD  azithromycin (ZITHROMAX) 500 MG tablet    cefTRIAXone (ROCEPHIN) 250 mg, lidocaine (XYLOCAINE) 1 % 0.9 mL for IM use    [id number]    CHLAMYDIA/GC PCR URINE OR SWAB   4. Scrotum swelling  [id number]           [phone]    US: pending - will be scheduled tomorrow. No s/sx of torsion at this time. Pt educated if pain gets worse to go to ER. Verbalizes understanding.     Educated in proper administration of medication(s) ordered today including safety, possible SE, risks, benefits, rationale and alternatives to therapy.     Educated in infection control practices.  100% condom use.   Abstain from IC for 7- 10 days.     Return to urgent care clinic or PCP  5-7 days if current symptoms are not resolving in a satisfactory manner or sooner if new or worsening symptoms occur. Differential diagnosis, natural history, supportive care, and indications for immediate follow-up discussed at length.   Advised of signs and symptoms which would warrant further evaluation and /or emergent evaluation in ER.    Verbalized agreement with this treatment plan and seemed to understand without barriers. Questions were encouraged and answered to satisfaction.

## 2020-01-29 ENCOUNTER — TELEPHONE (OUTPATIENT)
Dept: URGENT CARE | Facility: CLINIC | Age: 30
End: 2020-01-29

## 2020-01-30 ENCOUNTER — HOSPITAL ENCOUNTER (OUTPATIENT)
Dept: LAB | Facility: MEDICAL CENTER | Age: 30
End: 2020-01-30
Attending: PHYSICIAN ASSISTANT
Payer: COMMERCIAL

## 2020-01-30 ENCOUNTER — OFFICE VISIT (OUTPATIENT)
Dept: URGENT CARE | Facility: MEDICAL CENTER | Age: 30
End: 2020-01-30
Payer: COMMERCIAL

## 2020-01-30 ENCOUNTER — TELEPHONE (OUTPATIENT)
Dept: URGENT CARE | Facility: PHYSICIAN GROUP | Age: 30
End: 2020-01-30

## 2020-01-30 VITALS
BODY MASS INDEX: 22.69 KG/M2 | OXYGEN SATURATION: 98 % | HEART RATE: 71 BPM | WEIGHT: 172 LBS | TEMPERATURE: 98.3 F | SYSTOLIC BLOOD PRESSURE: 100 MMHG | DIASTOLIC BLOOD PRESSURE: 70 MMHG

## 2020-01-30 DIAGNOSIS — A74.9 CHLAMYDIA: ICD-10-CM

## 2020-01-30 DIAGNOSIS — Z11.3 SCREENING FOR STD (SEXUALLY TRANSMITTED DISEASE): ICD-10-CM

## 2020-01-30 LAB
HAV IGM SERPL QL IA: NEGATIVE
HBV CORE IGM SER QL: NEGATIVE
HBV SURFACE AG SER QL: NEGATIVE
HCV AB SER QL: NEGATIVE
HIV 1+2 AB+HIV1 P24 AG SERPL QL IA: NON REACTIVE
TREPONEMA PALLIDUM IGG+IGM AB [PRESENCE] IN SERUM OR PLASMA BY IMMUNOASSAY: NON REACTIVE

## 2020-01-30 PROCEDURE — 99213 OFFICE O/P EST LOW 20 MIN: CPT | Performed by: PHYSICIAN ASSISTANT

## 2020-01-30 PROCEDURE — 87491 CHLMYD TRACH DNA AMP PROBE: CPT

## 2020-01-30 PROCEDURE — 36415 COLL VENOUS BLD VENIPUNCTURE: CPT

## 2020-01-30 PROCEDURE — 86694 HERPES SIMPLEX NES ANTBDY: CPT

## 2020-01-30 PROCEDURE — 87591 N.GONORRHOEAE DNA AMP PROB: CPT

## 2020-01-30 PROCEDURE — 86780 TREPONEMA PALLIDUM: CPT

## 2020-01-30 PROCEDURE — 80074 ACUTE HEPATITIS PANEL: CPT

## 2020-01-30 PROCEDURE — 87389 HIV-1 AG W/HIV-1&-2 AB AG IA: CPT

## 2020-01-31 ASSESSMENT — ENCOUNTER SYMPTOMS
VOMITING: 0
SHORTNESS OF BREATH: 0
FLANK PAIN: 0
CHILLS: 0
DIZZINESS: 0
DIARRHEA: 0
ABDOMINAL PAIN: 0
FEVER: 0
NAUSEA: 0
MUSCULOSKELETAL NEGATIVE: 1

## 2020-01-31 NOTE — PROGRESS NOTES
Subjective:      Trung Pelayo is a 29 y.o. male who presents with Sexually Transmitted Diseases (wants blood work)            HPI  Patient presents to urgent care requesting a full STD screening panel. He was seen in urgent care 2 days ago tested positive for Chlamydia. He has already received treatment. He states his girlfriend recently tested positive for chlamydia and herpes. He denies any genital rashes, joint pain, or urinary symptoms. He has no known medical problems and doesn't take any regular medications.     Review of Systems   Constitutional: Negative for chills and fever.   HENT: Negative for congestion.    Respiratory: Negative for shortness of breath.    Cardiovascular: Negative for chest pain.   Gastrointestinal: Negative for abdominal pain, diarrhea, nausea and vomiting.   Genitourinary: Negative for dysuria, flank pain, frequency, hematuria and urgency.   Musculoskeletal: Negative.    Skin: Negative for rash.   Neurological: Negative for dizziness.        Objective:     /70 (BP Location: Left arm, Patient Position: Sitting, BP Cuff Size: Adult)   Pulse 71   Temp 36.8 °C (98.3 °F) (Temporal)   Wt 78 kg (172 lb)   SpO2 98%   BMI 22.69 kg/m²      Physical Exam  Vitals signs and nursing note reviewed.   Constitutional:       Appearance: He is well-developed.   HENT:      Head: Normocephalic and atraumatic.      Nose: Nose normal.      Mouth/Throat:      Mouth: Mucous membranes are moist.   Eyes:      Pupils: Pupils are equal, round, and reactive to light.   Neck:      Musculoskeletal: Normal range of motion.   Cardiovascular:      Rate and Rhythm: Normal rate and regular rhythm.   Pulmonary:      Effort: Pulmonary effort is normal.   Genitourinary:     Comments: Exam deferred   Musculoskeletal: Normal range of motion.   Skin:     General: Skin is warm and dry.   Neurological:      Mental Status: He is alert and oriented to person, place, and time.   Psychiatric:         Behavior: Behavior  normal.               PMH:  has no past medical history on file.  MEDS:   Current Outpatient Medications:   •  doxycycline (VIBRAMYCIN) 100 MG Cap, Take 1 Cap by mouth 2 times a day for 10 days., Disp: 20 Cap, Rfl: 0  ALLERGIES: No Known Allergies  SURGHX: History reviewed. No pertinent surgical history.  SOCHX:  reports that he has never smoked. He has never used smokeless tobacco. He reports that he does not drink alcohol or use drugs.  FH: family history is not on file.    Assessment/Plan:       1. Screening for STD (sexually transmitted disease)  - HIV AG/AB COMBO ASSAY SCREENING; Future  - TREPONEMA PALLIDUM ANTIBODIES  - HEPATITIS PANEL ACUTE(4 COMPONENTS); Future  - HSV 1/2 IGM    Patient will have blood work drawn for STD testing, pending results. Advised to avoid all sexual contact for 2-3 weeks. The patient demonstrated a good understanding and agreed with the treatment plan.

## 2020-02-02 LAB — HSV1+2 IGM SER IA-ACNC: 1.01 IV

## 2020-02-04 ENCOUNTER — TELEPHONE (OUTPATIENT)
Dept: URGENT CARE | Facility: CLINIC | Age: 30
End: 2020-02-04

## 2020-02-05 NOTE — TELEPHONE ENCOUNTER
Spoke to patient and informed him of blood work results, positive for HSV IGM antibodies in intermediate range. All other results negative. All questions answered. He states understanding and appreciates the phone call.

## 2020-02-07 ENCOUNTER — HOSPITAL ENCOUNTER (OUTPATIENT)
Dept: LAB | Facility: MEDICAL CENTER | Age: 30
End: 2020-02-07
Attending: PHYSICIAN ASSISTANT
Payer: COMMERCIAL

## 2020-02-07 ENCOUNTER — TELEPHONE (OUTPATIENT)
Dept: URGENT CARE | Facility: MEDICAL CENTER | Age: 30
End: 2020-02-07

## 2020-02-07 DIAGNOSIS — A74.9 CHLAMYDIA: ICD-10-CM

## 2020-02-07 PROCEDURE — 87491 CHLMYD TRACH DNA AMP PROBE: CPT

## 2020-02-07 PROCEDURE — 87591 N.GONORRHOEAE DNA AMP PROB: CPT

## 2020-02-07 NOTE — TELEPHONE ENCOUNTER
Warren Cerna,   This pt called asking for you , he mentioned that he finished his dosage but he is still experincing symptoms , he would like to know what he should do next?

## 2020-02-07 NOTE — PROGRESS NOTES
Patient called reporting he is still having urinary symptoms. Order placed for patient to have repeat GC/chlamydia testing, pending results.

## 2020-02-10 ENCOUNTER — TELEPHONE (OUTPATIENT)
Dept: URGENT CARE | Facility: PHYSICIAN GROUP | Age: 30
End: 2020-02-10

## 2020-02-10 DIAGNOSIS — A74.9 CHLAMYDIA: ICD-10-CM

## 2020-02-10 RX ORDER — DOXYCYCLINE HYCLATE 100 MG
100 TABLET ORAL 2 TIMES DAILY
Qty: 14 TAB | Refills: 0 | Status: SHIPPED | OUTPATIENT
Start: 2020-02-10 | End: 2020-02-17

## 2020-02-10 NOTE — TELEPHONE ENCOUNTER
Spoke to patient and informed him of positive Chlamydia results again. Will send additional course of doxycycline to patient's pharmacy. He will have repeat GC/Chlamydia performed after completing antibiotics.

## 2020-02-19 ENCOUNTER — HOSPITAL ENCOUNTER (OUTPATIENT)
Dept: LAB | Facility: MEDICAL CENTER | Age: 30
End: 2020-02-19
Attending: PHYSICIAN ASSISTANT
Payer: COMMERCIAL

## 2020-02-19 DIAGNOSIS — A74.9 CHLAMYDIA: ICD-10-CM

## 2020-02-19 PROCEDURE — 87591 N.GONORRHOEAE DNA AMP PROB: CPT

## 2020-02-19 PROCEDURE — 87491 CHLMYD TRACH DNA AMP PROBE: CPT

## 2020-02-20 LAB
C TRACH DNA SPEC QL NAA+PROBE: NEGATIVE
N GONORRHOEA DNA SPEC QL NAA+PROBE: NEGATIVE
SPECIMEN SOURCE: NORMAL

## 2020-02-21 ENCOUNTER — TELEPHONE (OUTPATIENT)
Dept: URGENT CARE | Facility: PHYSICIAN GROUP | Age: 30
End: 2020-02-21

## 2020-02-21 DIAGNOSIS — N50.89 TESTICULAR LUMP: ICD-10-CM

## 2020-04-01 ENCOUNTER — HOSPITAL ENCOUNTER (OUTPATIENT)
Dept: RADIOLOGY | Facility: MEDICAL CENTER | Age: 30
End: 2020-04-01
Attending: UROLOGY
Payer: COMMERCIAL

## 2020-04-01 DIAGNOSIS — N50.3 CYST, EPIDIDYMIS: ICD-10-CM

## 2020-04-01 PROCEDURE — 76870 US EXAM SCROTUM: CPT

## 2020-06-09 ENCOUNTER — HOSPITAL ENCOUNTER (OUTPATIENT)
Dept: LAB | Facility: MEDICAL CENTER | Age: 30
End: 2020-06-09
Attending: UROLOGY
Payer: COMMERCIAL

## 2020-06-09 PROCEDURE — 86694 HERPES SIMPLEX NES ANTBDY: CPT

## 2020-06-09 PROCEDURE — 86696 HERPES SIMPLEX TYPE 2 TEST: CPT

## 2020-06-09 PROCEDURE — 86695 HERPES SIMPLEX TYPE 1 TEST: CPT

## 2020-06-11 LAB — HSV1+2 IGG SER IA-ACNC: >22.4 IV

## 2020-06-12 LAB
HSV1 GG IGG SER-ACNC: 54.6 IV
HSV2 GG IGG SER-ACNC: 0.1 IV

## 2020-06-21 LAB — TEST NAME 95000: NORMAL

## 2021-01-24 ENCOUNTER — OFFICE VISIT (OUTPATIENT)
Dept: URGENT CARE | Facility: CLINIC | Age: 31
End: 2021-01-24
Payer: COMMERCIAL

## 2021-01-24 ENCOUNTER — APPOINTMENT (OUTPATIENT)
Dept: RADIOLOGY | Facility: IMAGING CENTER | Age: 31
End: 2021-01-24
Attending: NURSE PRACTITIONER
Payer: COMMERCIAL

## 2021-01-24 VITALS
OXYGEN SATURATION: 96 % | BODY MASS INDEX: 22.93 KG/M2 | WEIGHT: 173 LBS | RESPIRATION RATE: 12 BRPM | DIASTOLIC BLOOD PRESSURE: 84 MMHG | HEIGHT: 73 IN | SYSTOLIC BLOOD PRESSURE: 112 MMHG | TEMPERATURE: 98.6 F | HEART RATE: 88 BPM

## 2021-01-24 DIAGNOSIS — S49.92XA SHOULDER INJURY, LEFT, INITIAL ENCOUNTER: ICD-10-CM

## 2021-01-24 PROCEDURE — 73030 X-RAY EXAM OF SHOULDER: CPT | Mod: TC,LT | Performed by: NURSE PRACTITIONER

## 2021-01-24 PROCEDURE — 99214 OFFICE O/P EST MOD 30 MIN: CPT | Performed by: NURSE PRACTITIONER

## 2021-01-24 ASSESSMENT — ENCOUNTER SYMPTOMS
NECK PAIN: 0
MUSCLE WEAKNESS: 0
SHORTNESS OF BREATH: 0
BACK PAIN: 0
CHILLS: 0
VOMITING: 0
WEAKNESS: 0
SENSORY CHANGE: 0
TINGLING: 0
MYALGIAS: 0
FEVER: 0
NAUSEA: 0
FALLS: 1

## 2021-01-24 ASSESSMENT — FIBROSIS 4 INDEX: FIB4 SCORE: 0.76

## 2021-01-25 NOTE — PROGRESS NOTES
"Subjective:      Trung Pelayo is a 30 y.o. male who presents with Shoulder Injury (snowboarding accident thinks they dislocated shouler)        Patient states he was snowboarding this morning and fell directly on his left shoulder.  He denies loss of consciousness.  He denies any neck pain.  Most of the pain is on his lateral shoulder.  He was evaluated by the EMTs and placed in a sling with ice.    Shoulder Injury   Incident location: While snowboarding. The left shoulder is affected. The incident occurred 6 to 12 hours ago. The injury mechanism was a fall. The quality of the pain is described as aching. The pain does not radiate. The pain is at a severity of 7/10. The pain is moderate. Pertinent negatives include no chest pain, muscle weakness or tingling. The symptoms are aggravated by movement, overhead lifting and palpation. He has tried ice and immobilization for the symptoms. The treatment provided mild relief.       Review of Systems   Constitutional: Negative for chills and fever.   Respiratory: Negative for shortness of breath.    Cardiovascular: Negative for chest pain.   Gastrointestinal: Negative for nausea and vomiting.   Musculoskeletal: Positive for falls and joint pain. Negative for back pain, myalgias and neck pain.   Skin: Negative for rash.   Neurological: Negative for tingling, sensory change and weakness.   All other systems reviewed and are negative.         Objective:     /84   Pulse 88   Temp 37 °C (98.6 °F) (Temporal)   Resp 12   Ht 1.854 m (6' 1\")   Wt 78.5 kg (173 lb)   SpO2 96%   BMI 22.82 kg/m²      Physical Exam  Vitals signs reviewed.   Constitutional:       General: He is not in acute distress.     Appearance: Normal appearance. He is not ill-appearing.   HENT:      Head: Normocephalic.      Right Ear: External ear normal.      Left Ear: External ear normal.      Nose: Nose normal.      Mouth/Throat:      Mouth: Mucous membranes are moist.   Eyes:      Extraocular " Movements: Extraocular movements intact.      Conjunctiva/sclera: Conjunctivae normal.   Neck:      Musculoskeletal: Normal range of motion.   Cardiovascular:      Rate and Rhythm: Normal rate and regular rhythm.      Pulses: Normal pulses.   Pulmonary:      Effort: Pulmonary effort is normal.   Abdominal:      Palpations: Abdomen is soft.   Musculoskeletal:      Left shoulder: He exhibits decreased range of motion, tenderness, bony tenderness, swelling and pain. He exhibits no effusion, no deformity, normal pulse and normal strength.   Skin:     General: Skin is warm and dry.      Capillary Refill: Capillary refill takes less than 2 seconds.   Neurological:      Mental Status: He is alert and oriented to person, place, and time.   Psychiatric:         Mood and Affect: Mood normal.         Behavior: Behavior normal.           DX-SHOULDER 2+ LEFT  Narrative: 1/24/2021 6:09 PM    HISTORY/REASON FOR EXAM:  Left shoulder pain after snowboarding injury    TECHNIQUE/EXAM DESCRIPTION AND NUMBER OF VIEWS:  3 views of the LEFT shoulder.    COMPARISON: None    FINDINGS:  There is no evidence of fracture or dislocation.  AC joint is intact.  The visualized lung parenchyma is clear.  Impression: Negative LEFT shoulder series.         Assessment/Plan:         1. Shoulder injury, left, initial encounter  DX-SHOULDER 2+ LEFT    REFERRAL TO SPORTS MEDICINE     X-ray results reviewed with patient and there is no fracture dislocation.     RICE therapy discussed  Gentle ROM exercises discussed  Sling for comfort  Ice/heat therapy discussed  May take over-the-counter Ibuprofen 600-800 mg every 8 hours as needed for pain  Rest, fluids encouraged.  AVS with medical info given.  Pt was in full understanding and agreement with the plan.  Follow-up as needed if symptoms worsen or fail to improve.    Total 30 minutes face-to-face time spent with patient, with greater than 50% of the total time discussing patient's issues and symptoms as  listed above in assessment and plan, as well as managing coordination of care for future evaluation and treatment.    Please note that this dictation was created using voice recognition software. I have made every reasonable attempt to correct obvious errors, but I expect that there are errors of grammar and possibly content that I did not discover before finalizing the note.

## 2021-01-29 ENCOUNTER — OFFICE VISIT (OUTPATIENT)
Dept: MEDICAL GROUP | Facility: CLINIC | Age: 31
End: 2021-01-29
Payer: COMMERCIAL

## 2021-01-29 VITALS
SYSTOLIC BLOOD PRESSURE: 108 MMHG | RESPIRATION RATE: 14 BRPM | OXYGEN SATURATION: 98 % | BODY MASS INDEX: 22.93 KG/M2 | WEIGHT: 173 LBS | HEART RATE: 80 BPM | HEIGHT: 73 IN | TEMPERATURE: 98.4 F | DIASTOLIC BLOOD PRESSURE: 70 MMHG

## 2021-01-29 DIAGNOSIS — S06.9X0A MILD TRAUMATIC BRAIN INJURY, WITHOUT LOSS OF CONSCIOUSNESS, INITIAL ENCOUNTER (HCC): ICD-10-CM

## 2021-01-29 DIAGNOSIS — S43.102A ACROMIOCLAVICULAR SEPARATION, TYPE 1, LEFT, INITIAL ENCOUNTER: ICD-10-CM

## 2021-01-29 PROCEDURE — 99203 OFFICE O/P NEW LOW 30 MIN: CPT | Performed by: FAMILY MEDICINE

## 2021-01-29 ASSESSMENT — FIBROSIS 4 INDEX: FIB4 SCORE: 0.76

## 2021-01-29 NOTE — Clinical Note
Warren Mays,    Thank you for referring Trung to our sports medicine clinic.  Fortunately, he seems to be doing fairly well.  His grade 1 acromioclavicular separation should improve in the coming weeks.  We will see him back in a couple of weeks to verify that he is healing accordingly.    Thanks!  L

## 2021-01-30 NOTE — PROGRESS NOTES
CHIEF COMPLAINT:  Trung Pelayo male presenting at the request of JUAN ANTONIO Bonds for evaluation of Shoulder pain.     Trung Pelayo is complaining of left shoulder pain  Date of injury, January 24, 2021  Mechanism injury, snowboarding and fall directly onto his LEFT shoulder  Right nidia  Inadvertently ended up in the terrain park and took a jump catching his front edge causing him to fall directly forward onto his left shoulder and head  Pain is at the superior  Quality is sharp at the AC joint and achy at the LEFT shoulder/deltoid region  Pain is Non-radiating  Aggravated by movement, particularly with abduction  Improved with  rest   no prior problems with this shoulder in the past  Prior Treatments: Initially seen at the first[facility]aid clinic at [facility], then was subsequently seen at urgent care  Prior studies: X-Ray   Medications tried for pain include: ibuprofen (OTC) which helps some  Mechanical Symptom history: No Locking     Initially, he felt nauseous after the trauma, hit his head  Fortunately, he was wearing a helmet  Mother states he is not forgetting things and he is acting himself  He denies any dizziness  He denies any blurry vision    , makes videogames  Likes snowboarding, basketball, soccer, [employer]    REVIEW OF SYSTEMS  No Vomiting, No Chest Pain, No Shortness of Breath, No Dizziness, No Headache, Nausea    PAST MEDICAL HISTORY:   History reviewed. No pertinent past medical history.    PMH:  has no past medical history on file.  MEDS: No current outpatient medications on file.  ALLERGIES: No Known Allergies  SURGHX: No past surgical history on file.  SOCHX:  reports that he has never smoked. He has never used smokeless tobacco. He reports that he does not drink alcohol or use drugs.  FH: Family history was reviewed, no pertinent findings to report     PHYSICAL EXAM:  /70 (BP Location: Right arm, Patient Position: Sitting, BP Cuff Size: Adult)   Pulse  "80   Temp 36.9 °C (98.4 °F) (Temporal)   Resp 14   Ht 1.854 m (6' 1\")   Wt 78.5 kg (173 lb)   SpO2 98%   BMI 22.82 kg/m²      well-developed, well-nourished in no apparent distress, alert and oriented x 3.  Gait: normal    Cervical spine:  Range of motion Intact  Spurling's testing is NEGATIVE  Cervical spine tenderness NEGATIVE    Strength testing:     Deltoid, bilateral 5/5  Bicep, bilateral 5/5  Tricep, bilateral 5/5  Wrist Extension, bilateral 5/5  Wrist Flexion, bilateral 5/5  Finger Abduction, bilateral 5/5    Sensation:  INTACT Bilaterally    Reflexes:   Biceps: R 2+/L 2+  Triceps: R 2+/L  2+  Brachial radialis R 2+/L  2+  Bynum's testing is NEGATIVE  The arms are otherwise neurovascularly intact     Shoulder Exam:    RIGHT Shoulder:  No visible swelling   Range of motion INTACT  Tenderness: Non-tender  Empty Can Testing 5/5  Internal Rotation 5/5  External Rotation 5/5  Lift Off Testing 5/5  Impingement testing Norton  NEGATIVE  Neer's testing NEGATIVE  Apprehension testing NEGATIVE    LEFT Shoulder:  POSITIVE swelling noted , Minimally along the region of the AC joint    Range of motion DIMINISHED with pain approximately 70% normal motion  Tenderness: AC Joint Tenderness  Empty Can Testing 5/5  Internal Rotation 5/5  External Rotation 5/5  Lift Off Testing 5/5  Impingement testing Norton  NEGATIVE  Neer's testing NEGATIVE    Additional Findings: None    1. Acromioclavicular separation, type 1, left, initial encounter     2. Mild traumatic brain injury, without loss of consciousness, initial encounter ([facility])       Date of injury, January 24, 2021  Mechanism injury, snowboarding and fall directly onto his LEFT shoulder    He got a sling at urgent care, but it was not very comfortable  Therefore, I ordered 1 online  Recommend use of sling for the next 3 to 4 weeks for comfort    Recommend avoiding snowboarding for at least 6 weeks    Return in about 2 weeks (around 2/12/2021).   To see how he is " progressing  He is doing so well today, that he has been advised that if he is feeling much better he can always his follow-up visit in follow-up as needed        1/24/2021 6:09 PM     HISTORY/REASON FOR EXAM:  Left shoulder pain after snowboarding injury        TECHNIQUE/EXAM DESCRIPTION AND NUMBER OF VIEWS:  3 views of the LEFT shoulder.     COMPARISON: None     FINDINGS:  There is no evidence of fracture or dislocation.  AC joint is intact.  The visualized lung parenchyma is clear.     IMPRESSION:     Negative LEFT shoulder series.        done elsewhere and reviewed independently by me    Thank you JUAN ANTONIO Bonds for allowing me to participate in caring for your patient.

## 2021-02-11 ENCOUNTER — OFFICE VISIT (OUTPATIENT)
Dept: MEDICAL GROUP | Facility: CLINIC | Age: 31
End: 2021-02-11
Payer: COMMERCIAL

## 2021-02-11 VITALS
RESPIRATION RATE: 16 BRPM | DIASTOLIC BLOOD PRESSURE: 80 MMHG | WEIGHT: 173 LBS | HEART RATE: 84 BPM | BODY MASS INDEX: 22.93 KG/M2 | HEIGHT: 73 IN | TEMPERATURE: 98.4 F | OXYGEN SATURATION: 97 % | SYSTOLIC BLOOD PRESSURE: 118 MMHG

## 2021-02-11 DIAGNOSIS — S43.102A ACROMIOCLAVICULAR SEPARATION, TYPE 1, LEFT, INITIAL ENCOUNTER: ICD-10-CM

## 2021-02-11 PROCEDURE — 99213 OFFICE O/P EST LOW 20 MIN: CPT | Performed by: FAMILY MEDICINE

## 2021-02-11 ASSESSMENT — FIBROSIS 4 INDEX: FIB4 SCORE: 0.76

## 2021-02-11 NOTE — Clinical Note
Warren Mays,   We saw Trung today in follow-up.  Fortunately, he is improving as expected.  He is doing so well he has been advised to follow-up on an as-needed basis.  We did provide him with some home exercises to strengthen the shoulder over the next few weeks.  Thanks!  L

## 2021-02-12 NOTE — PROGRESS NOTES
"CHIEF COMPLAINT:  Trung Pelayo male presenting at the request of JUAN ANTONIO Bonds for evaluation of Shoulder pain.     Trung Pelayo is complaining of left shoulder pain  Date of injury, January 24, 2021  Mechanism injury, snowboarding and fall directly onto his LEFT shoulder  Right nidia  Inadvertently ended up in the terrain park and took a jump catching his front edge causing him to fall directly forward onto his left shoulder and head    He is doing BETTER, range of motion has IMPROVED  Still not tolerating his sling, he did purchase 1 online, but it does not feel very comfortable either    , makes videogames  Likes snowboarding, basketball, soccer, jujitsu     PHYSICAL EXAM:  /80 (BP Location: Right arm, Patient Position: Sitting, BP Cuff Size: Adult)   Pulse 84   Temp 36.9 °C (98.4 °F) (Temporal)   Resp 16   Ht 1.854 m (6' 1\")   Wt 78.5 kg (173 lb)   SpO2 97%   BMI 22.82 kg/m²      well-developed, well-nourished in no apparent distress, alert and oriented x 3.  Gait: normal    LEFT Shoulder:  MINIMAL swelling noted , Minimally along the region of the AC joint    Range of motion IMPROVED with pain approximately 80% normal motion  Tenderness: MILD AC Joint Tenderness  Empty Can Testing 5/5  Internal Rotation 5/5  External Rotation 5/5  Lift Off Testing 5/5  Impingement testing Norton  NEGATIVE  Neer's testing NEGATIVE    Additional Findings: None    1. Acromioclavicular separation, type 1, left, initial encounter       Date of injury, January 24, 2021  Mechanism injury, snowboarding and fall directly onto his LEFT shoulder    At this point, is not tolerating his sling, makes his pain worse.  Recommend continuing guarding the shoulder for at least 1 month from the date of injury (until February 22, 2021)    He has been provided with some shoulder exercises which she can start in the next 2 weeks    Recommend avoiding snowboarding for at least 6 weeks    Follow-up as " needed          1/24/2021 6:09 PM     HISTORY/REASON FOR EXAM:  Left shoulder pain after snowboarding injury        TECHNIQUE/EXAM DESCRIPTION AND NUMBER OF VIEWS:  3 views of the LEFT shoulder.     COMPARISON: None     FINDINGS:  There is no evidence of fracture or dislocation.  AC joint is intact.  The visualized lung parenchyma is clear.     IMPRESSION:     Negative LEFT shoulder series.        Thank you JUAN ANTONIO Bonds for allowing me to participate in caring for your patient.

## 2021-02-26 ENCOUNTER — OFFICE VISIT (OUTPATIENT)
Dept: MEDICAL GROUP | Facility: CLINIC | Age: 31
End: 2021-02-26
Payer: COMMERCIAL

## 2021-02-26 VITALS
BODY MASS INDEX: 22.93 KG/M2 | TEMPERATURE: 98.3 F | SYSTOLIC BLOOD PRESSURE: 114 MMHG | DIASTOLIC BLOOD PRESSURE: 72 MMHG | HEART RATE: 76 BPM | HEIGHT: 73 IN | OXYGEN SATURATION: 98 % | WEIGHT: 173 LBS | RESPIRATION RATE: 16 BRPM

## 2021-02-26 DIAGNOSIS — S43.102D: ICD-10-CM

## 2021-02-26 PROCEDURE — 99213 OFFICE O/P EST LOW 20 MIN: CPT | Performed by: FAMILY MEDICINE

## 2021-02-26 ASSESSMENT — FIBROSIS 4 INDEX: FIB4 SCORE: 0.76

## 2021-02-26 NOTE — Clinical Note
Warren Mays,  We saw Trung in follow-up once again and he is doing very well.  He is doing better than most at this point.  The plan is to have him be in the sling for 1 more week and then we will have him start formal physical therapy around the third week of March since he likes to play basketball and his shoulder is likely that we can from the prolonged immobilization.  Thanks!  L

## 2021-02-27 NOTE — PROGRESS NOTES
"CHIEF COMPLAINT:  Trung Pelayo male presenting at the request of JUAN ANTONIO Bonds for evaluation of Shoulder pain.     Trung Pelayo is complaining of left shoulder pain  Date of injury, January 24, 2021  Mechanism injury, snowboarding and fall directly onto his LEFT shoulder  Right nidia  Inadvertently ended up in the terrain park and took a jump catching his front edge causing him to fall directly forward onto his left shoulder and head    He is doing BETTER, range of motion has IMPROVED  No sleeping through the night but he does have some morning achiness    , makes videogames  Likes snowboarding, basketball, soccer, [employer]     PHYSICAL EXAM:  /72 (BP Location: Right arm, Patient Position: Sitting, BP Cuff Size: Adult)   Pulse 76   Temp 36.8 °C (98.3 °F) (Temporal)   Resp 16   Ht 1.854 m (6' 1\")   Wt 78.5 kg (173 lb)   SpO2 98%   BMI 22.82 kg/m²      well-developed, well-nourished in no apparent distress, alert and oriented x 3.  Gait: normal    LEFT Shoulder:  Tenderness minimally along the region of the AC joint    Range of motion is INTACT  Tenderness: MILD AC Joint Tenderness  Empty Can Testing 5/5  Internal Rotation 5/5  External Rotation 5/5  Lift Off Testing 5/5  Impingement testing Norton  NEGATIVE  Neer's testing NEGATIVE    Additional Findings: None    1. Acromioclavicular separation, type 1, left, subsequent encounter       Date of injury, January 24, 2021  Mechanism injury, snowboarding and fall directly onto his LEFT shoulder    At this point, is not tolerating his sling, makes his pain worse.  Recommend continuing guarding the shoulder for at least 1 month from the date of injury (until February 22, 2021)    He has been provided with some shoulder exercises which she can start in the next 2 weeks  Referred for formal physical therapy to start around the third week of March   Referral placed to [facility] ER location    Recommend avoiding snowboarding for at " least 6 weeks    Return in about 6 weeks (around 4/9/2021), or if symptoms worsen or fail to improve.           1/24/2021 6:09 PM     HISTORY/REASON FOR EXAM:  Left shoulder pain after snowboarding injury        TECHNIQUE/EXAM DESCRIPTION AND NUMBER OF VIEWS:  3 views of the LEFT shoulder.     COMPARISON: None     FINDINGS:  There is no evidence of fracture or dislocation.  AC joint is intact.  The visualized lung parenchyma is clear.     IMPRESSION:     Negative LEFT shoulder series.        Thank you JUAN ANTONIO Bonds for allowing me to participate in caring for your patient.

## 2021-03-30 ENCOUNTER — PHYSICAL THERAPY (OUTPATIENT)
Dept: PHYSICAL THERAPY | Facility: MEDICAL CENTER | Age: 31
End: 2021-03-30
Attending: FAMILY MEDICINE
Payer: COMMERCIAL

## 2021-03-30 DIAGNOSIS — S43.102D: ICD-10-CM

## 2021-03-30 PROCEDURE — 999999 HB NO CHARGE

## 2021-03-30 ASSESSMENT — ENCOUNTER SYMPTOMS: ADDITIONAL INFORMATION: AGGRAVATING:    RELIEVING:

## 2021-03-30 NOTE — OP THERAPY EVALUATION
No charge visit. PT evaluation not completed.    Pt presenting to physical therapy today and did not complete evaluation due to high copay ($353). Pt reporting does not know if he requires therapy as he has noticed significant increases in shoulder ROM and strength independently with exercises prescribed by PCP.     Discussed following up with PT in future if not continuing to improve of difficulties with ADL's and hobbies; pt verbalized understanding and agreement to plan.

## 2021-03-30 NOTE — OP THERAPY EVALUATION
Outpatient Physical Therapy  INITIAL EVALUATION    [facility]  [address]  [place] [address]  Phone:  [phone]  Fax:  [phone]    Date of Evaluation: 03/30/2021    Patient: Trung Pelayo  YOB: 1990  MRN: [id number]     Referring Provider: Norbert Yang M.D.  [address] [place] [address] [place] [address]   Referring Diagnosis Acromioclavicular separation, type 1, left, subsequent encounter [S43.102D]     Time Calculation                   Chief Complaint: No chief complaint on file.    Visit Diagnoses     ICD-10-CM   1. Acromioclavicular separation, type 1, left, subsequent encounter  S43.102D       No data found  Subjective:   History of Present Illness:     Mechanism of injury:  Patient is a 30 year old male with no medical or surgical hx on file.     Pt presents to therapy with complaints of L shoulder pain which occurred after snowboarding incident after catching front edge and falling directly on L shoulder and head (was wearing helmet). Was originally wearing sling x weeks; ordered new sling online.     Currently denies changes in bowel and bladder, saddle anesthesia, significant weight changes, chills/night sweats, nausea and vomiting, and unexplained fatigue. *** history of cancer.  Pt consents to evaluation and treatment today.     Pt currently denies: Dizziness, diploplia, dysphasia, dysarthria, drop attacks, nausea, nystagmus, vision changes, and numbness.        Pain:     Pain Comments::  Aggravating:    Relieving:  Diagnostic Tests:     Diagnostic Tests Comments:  L shoulder x ray 1/24/21:  FINDINGS:  There is no evidence of fracture or dislocation.  AC joint is intact.  The visualized lung parenchyma is clear.     IMPRESSION:     Negative LEFT shoulder series.    Activities of Daily Living:     Patient reported ADL status: Patient's current daily routine includes:  Work: , makes videogames  Hobbies:  Likes snowboarding, basketball, soccer, jujitsu  Exercise: No current exercise routine in place  School:    Is currently able to complete ***, unable to complete ***. *** use of AD. Has current DME: ***.        No past medical history on file.  No past surgical history on file.  Social History     Tobacco Use   • Smoking status: Never Smoker   • Smokeless tobacco: Never Used   Substance Use Topics   • Alcohol use: No     Family and Occupational History     Socioeconomic History   • Marital status: Single     Spouse name: Not on file   • Number of children: Not on file   • Years of education: Not on file   • Highest education level: Not on file   Occupational History   • Not on file       Objective    Exercises/Treatment  Time-based treatments/modalities:           Assessment, Response and Plan:   Assessment details:  Patient is a ***   Exam findings suggestive of  Pt may benefit from skilled physical therapy in order to address above impairments in order to improve QOL and return to reported ADL's.       Plan:   Discussed with:  Patient  Plan details:  UPOC:     20v authorized      Functional Assessment Used        Referring provider co-signature:  I have reviewed this plan of care and my co-signature certifies the need for services.    Certification Period: 03/30/2021 to  {Future Date:85319}    Physician Signature: ________________________________ Date: ______________

## 2021-04-06 ENCOUNTER — APPOINTMENT (OUTPATIENT)
Dept: PHYSICAL THERAPY | Facility: MEDICAL CENTER | Age: 31
End: 2021-04-06
Payer: COMMERCIAL

## 2021-04-09 ENCOUNTER — APPOINTMENT (OUTPATIENT)
Dept: PHYSICAL THERAPY | Facility: MEDICAL CENTER | Age: 31
End: 2021-04-09
Payer: COMMERCIAL

## 2021-04-13 ENCOUNTER — APPOINTMENT (OUTPATIENT)
Dept: PHYSICAL THERAPY | Facility: MEDICAL CENTER | Age: 31
End: 2021-04-13
Payer: COMMERCIAL

## 2021-04-16 ENCOUNTER — APPOINTMENT (OUTPATIENT)
Dept: PHYSICAL THERAPY | Facility: MEDICAL CENTER | Age: 31
End: 2021-04-16
Payer: COMMERCIAL

## 2021-04-20 ENCOUNTER — APPOINTMENT (OUTPATIENT)
Dept: PHYSICAL THERAPY | Facility: MEDICAL CENTER | Age: 31
End: 2021-04-20
Payer: COMMERCIAL

## 2021-04-23 ENCOUNTER — APPOINTMENT (OUTPATIENT)
Dept: PHYSICAL THERAPY | Facility: MEDICAL CENTER | Age: 31
End: 2021-04-23
Payer: COMMERCIAL

## 2022-02-14 ENCOUNTER — TELEPHONE (OUTPATIENT)
Dept: SCHEDULING | Facility: IMAGING CENTER | Age: 32
End: 2022-02-14

## 2022-02-16 ENCOUNTER — OFFICE VISIT (OUTPATIENT)
Dept: MEDICAL GROUP | Facility: MEDICAL CENTER | Age: 32
End: 2022-02-16
Payer: COMMERCIAL

## 2022-02-16 VITALS
SYSTOLIC BLOOD PRESSURE: 122 MMHG | HEIGHT: 73 IN | WEIGHT: 163 LBS | BODY MASS INDEX: 21.6 KG/M2 | TEMPERATURE: 98 F | HEART RATE: 75 BPM | DIASTOLIC BLOOD PRESSURE: 64 MMHG | OXYGEN SATURATION: 100 %

## 2022-02-16 DIAGNOSIS — Z41.8: ICD-10-CM

## 2022-02-16 DIAGNOSIS — Z00.00 HEALTHCARE MAINTENANCE: ICD-10-CM

## 2022-02-16 DIAGNOSIS — Z83.1 FAMILY HISTORY OF STDS: ICD-10-CM

## 2022-02-16 PROBLEM — Z13.9 ENCOUNTER FOR SCREENING, UNSPECIFIED: Status: RESOLVED | Noted: 2019-11-04 | Resolved: 2022-02-16

## 2022-02-16 PROBLEM — Z13.9 ENCOUNTER FOR SCREENING, UNSPECIFIED: Status: ACTIVE | Noted: 2019-11-04

## 2022-02-16 PROCEDURE — 99203 OFFICE O/P NEW LOW 30 MIN: CPT | Performed by: STUDENT IN AN ORGANIZED HEALTH CARE EDUCATION/TRAINING PROGRAM

## 2022-02-16 SDOH — ECONOMIC STABILITY: HOUSING INSECURITY: IN THE LAST 12 MONTHS, HOW MANY PLACES HAVE YOU LIVED?: 1

## 2022-02-16 SDOH — ECONOMIC STABILITY: TRANSPORTATION INSECURITY
IN THE PAST 12 MONTHS, HAS LACK OF RELIABLE TRANSPORTATION KEPT YOU FROM MEDICAL APPOINTMENTS, MEETINGS, WORK OR FROM GETTING THINGS NEEDED FOR DAILY LIVING?: NO

## 2022-02-16 SDOH — ECONOMIC STABILITY: HOUSING INSECURITY
IN THE LAST 12 MONTHS, WAS THERE A TIME WHEN YOU DID NOT HAVE A STEADY PLACE TO SLEEP OR SLEPT IN A SHELTER (INCLUDING NOW)?: NO

## 2022-02-16 SDOH — ECONOMIC STABILITY: INCOME INSECURITY: IN THE LAST 12 MONTHS, WAS THERE A TIME WHEN YOU WERE NOT ABLE TO PAY THE MORTGAGE OR RENT ON TIME?: NO

## 2022-02-16 SDOH — HEALTH STABILITY: PHYSICAL HEALTH: ON AVERAGE, HOW MANY MINUTES DO YOU ENGAGE IN EXERCISE AT THIS LEVEL?: 60 MIN

## 2022-02-16 SDOH — ECONOMIC STABILITY: FOOD INSECURITY: WITHIN THE PAST 12 MONTHS, YOU WORRIED THAT YOUR FOOD WOULD RUN OUT BEFORE YOU GOT MONEY TO BUY MORE.: NEVER TRUE

## 2022-02-16 SDOH — ECONOMIC STABILITY: INCOME INSECURITY: HOW HARD IS IT FOR YOU TO PAY FOR THE VERY BASICS LIKE FOOD, HOUSING, MEDICAL CARE, AND HEATING?: NOT HARD AT ALL

## 2022-02-16 SDOH — ECONOMIC STABILITY: TRANSPORTATION INSECURITY
IN THE PAST 12 MONTHS, HAS THE LACK OF TRANSPORTATION KEPT YOU FROM MEDICAL APPOINTMENTS OR FROM GETTING MEDICATIONS?: NO

## 2022-02-16 SDOH — HEALTH STABILITY: PHYSICAL HEALTH: ON AVERAGE, HOW MANY DAYS PER WEEK DO YOU ENGAGE IN MODERATE TO STRENUOUS EXERCISE (LIKE A BRISK WALK)?: 5 DAYS

## 2022-02-16 SDOH — ECONOMIC STABILITY: TRANSPORTATION INSECURITY
IN THE PAST 12 MONTHS, HAS LACK OF TRANSPORTATION KEPT YOU FROM MEETINGS, WORK, OR FROM GETTING THINGS NEEDED FOR DAILY LIVING?: NO

## 2022-02-16 SDOH — ECONOMIC STABILITY: FOOD INSECURITY: WITHIN THE PAST 12 MONTHS, THE FOOD YOU BOUGHT JUST DIDN'T LAST AND YOU DIDN'T HAVE MONEY TO GET MORE.: NEVER TRUE

## 2022-02-16 SDOH — HEALTH STABILITY: MENTAL HEALTH
STRESS IS WHEN SOMEONE FEELS TENSE, NERVOUS, ANXIOUS, OR CAN'T SLEEP AT NIGHT BECAUSE THEIR MIND IS TROUBLED. HOW STRESSED ARE YOU?: NOT AT ALL

## 2022-02-16 ASSESSMENT — SOCIAL DETERMINANTS OF HEALTH (SDOH)
HOW MANY DRINKS CONTAINING ALCOHOL DO YOU HAVE ON A TYPICAL DAY WHEN YOU ARE DRINKING: PATIENT DECLINED
ARE YOU MARRIED, WIDOWED, DIVORCED, SEPARATED, NEVER MARRIED, OR LIVING WITH A PARTNER?: NEVER MARRIED
IN A TYPICAL WEEK, HOW MANY TIMES DO YOU TALK ON THE PHONE WITH FAMILY, FRIENDS, OR NEIGHBORS?: MORE THAN THREE TIMES A WEEK
HOW OFTEN DO YOU GET TOGETHER WITH FRIENDS OR RELATIVES?: TWICE A WEEK
HOW OFTEN DO YOU ATTEND CHURCH OR RELIGIOUS SERVICES?: NEVER
IN A TYPICAL WEEK, HOW MANY TIMES DO YOU TALK ON THE PHONE WITH FAMILY, FRIENDS, OR NEIGHBORS?: MORE THAN THREE TIMES A WEEK
ARE YOU MARRIED, WIDOWED, DIVORCED, SEPARATED, NEVER MARRIED, OR LIVING WITH A PARTNER?: NEVER MARRIED
HOW OFTEN DO YOU HAVE A DRINK CONTAINING ALCOHOL: NEVER
HOW OFTEN DO YOU ATTENT MEETINGS OF THE CLUB OR ORGANIZATION YOU BELONG TO?: PATIENT DECLINED
HOW OFTEN DO YOU ATTEND CHURCH OR RELIGIOUS SERVICES?: NEVER
HOW OFTEN DO YOU ATTENT MEETINGS OF THE CLUB OR ORGANIZATION YOU BELONG TO?: PATIENT DECLINED
HOW OFTEN DO YOU GET TOGETHER WITH FRIENDS OR RELATIVES?: TWICE A WEEK
DO YOU BELONG TO ANY CLUBS OR ORGANIZATIONS SUCH AS CHURCH GROUPS UNIONS, FRATERNAL OR ATHLETIC GROUPS, OR SCHOOL GROUPS?: NO
HOW OFTEN DO YOU HAVE SIX OR MORE DRINKS ON ONE OCCASION: PATIENT DECLINED
HOW HARD IS IT FOR YOU TO PAY FOR THE VERY BASICS LIKE FOOD, HOUSING, MEDICAL CARE, AND HEATING?: NOT HARD AT ALL
WITHIN THE PAST 12 MONTHS, YOU WORRIED THAT YOUR FOOD WOULD RUN OUT BEFORE YOU GOT THE MONEY TO BUY MORE: NEVER TRUE
DO YOU BELONG TO ANY CLUBS OR ORGANIZATIONS SUCH AS CHURCH GROUPS UNIONS, FRATERNAL OR ATHLETIC GROUPS, OR SCHOOL GROUPS?: NO

## 2022-02-16 ASSESSMENT — LIFESTYLE VARIABLES
HOW OFTEN DO YOU HAVE SIX OR MORE DRINKS ON ONE OCCASION: PATIENT DECLINED
HOW OFTEN DO YOU HAVE A DRINK CONTAINING ALCOHOL: NEVER
HOW MANY STANDARD DRINKS CONTAINING ALCOHOL DO YOU HAVE ON A TYPICAL DAY: PATIENT DECLINED

## 2022-02-16 ASSESSMENT — PATIENT HEALTH QUESTIONNAIRE - PHQ9: CLINICAL INTERPRETATION OF PHQ2 SCORE: 0

## 2022-02-16 ASSESSMENT — ENCOUNTER SYMPTOMS
CHILLS: 0
FEVER: 0
SHORTNESS OF BREATH: 0

## 2022-02-16 NOTE — PROGRESS NOTES
"Subjective:     CC:  Diagnoses of Healthcare maintenance, Encounter for elective hair transplant, and Family history of STDs were pertinent to this visit.    HISTORY OF THE PRESENT ILLNESS: Patient is a 31 y.o. male with the following medical problems History reviewed. No pertinent past medical history.  . This pleasant patient is here today to establish care and discuss the following;    Problem   Family History of Stds    Patient was positive in the past for chlamydia and HSV in the past      Encounter for Elective Hair Transplant    Patient will be undergoing hair transplant surgery on April 28, 2022 with Dr. Dave Nieto who requested form completion and labs including HIV screening, hepatitis B and C, PT with INR, CBC, BMP.  Patient does not have any cardiac or respiratory issues and is able to climb a flight of stairs without any issues.         No current [facility]-ordered outpatient medications on file.     No current [facility]-ordered facility-administered medications on file.         ROS:   Review of Systems   Constitutional: Negative for chills and fever.   Respiratory: Negative for shortness of breath.    Cardiovascular: Negative for chest pain.         Objective:     Exam: /64 (BP Location: Left arm, Patient Position: Sitting, BP Cuff Size: Adult)   Pulse 75   Temp 36.7 °C (98 °F) (Temporal)   Ht 1.854 m (6' 1\")   Wt 73.9 kg (163 lb)   SpO2 100%  Body mass index is 21.51 kg/m².    Physical Exam  Constitutional:       Appearance: Normal appearance.   Cardiovascular:      Rate and Rhythm: Normal rate and regular rhythm.      Pulses: Normal pulses.   Pulmonary:      Effort: Pulmonary effort is normal.      Breath sounds: Normal breath sounds.   Musculoskeletal:      Cervical back: Normal range of motion.   Neurological:      Mental Status: He is alert.   Psychiatric:         Mood and Affect: Mood normal.         Behavior: Behavior normal.         Thought Content: Thought content normal.         " Judgment: Judgment normal.               Assessment & Plan:     Problem List Items Addressed This Visit     Encounter for elective hair transplant     -lab tests ordered  -form completed, scanned into to chart and given back to patient          Relevant Orders    PT AND PTT    Prothrombin Time    HIV AG/AB COMBO ASSAY SCREENING    HEP B SURFACE ANTIGEN    HEP C VIRUS ANTIBODY    Family history of STDs    Relevant Orders    T.PALLIDUM AB EIA    Chlamydia/GC PCR Urine Or Swab    HSV 1/2 IGG W/ TYPE SPECIFIC RFLX      Other Visit Diagnoses     Healthcare maintenance        Relevant Orders    HEMOGLOBIN A1C    CBC WITH DIFFERENTIAL    Comp Metabolic Panel    TSH WITH REFLEX TO FT4    Lipid Profile          I spent a total of 30 minutes with record review, exam, communication with the patient, and documentation of this encounter.    Return in about 2 months (around 4/16/2022) for Annual and labs .    Please note that this dictation was created using voice recognition software. I have made every reasonable attempt to correct obvious errors, but I expect that there are errors of grammar and possibly content that I did not discover before finalizing the note.

## 2022-02-18 ENCOUNTER — HOSPITAL ENCOUNTER (OUTPATIENT)
Dept: LAB | Facility: MEDICAL CENTER | Age: 32
End: 2022-02-18
Attending: STUDENT IN AN ORGANIZED HEALTH CARE EDUCATION/TRAINING PROGRAM
Payer: COMMERCIAL

## 2022-02-18 DIAGNOSIS — Z41.8: ICD-10-CM

## 2022-02-18 DIAGNOSIS — Z83.1 FAMILY HISTORY OF STDS: ICD-10-CM

## 2022-02-18 DIAGNOSIS — Z00.00 HEALTHCARE MAINTENANCE: ICD-10-CM

## 2022-02-18 LAB
ALBUMIN SERPL BCP-MCNC: 4.7 G/DL (ref 3.2–4.9)
ALBUMIN/GLOB SERPL: 2 G/DL
ALP SERPL-CCNC: 47 U/L (ref 30–99)
ALT SERPL-CCNC: 31 U/L (ref 2–50)
ANION GAP SERPL CALC-SCNC: 12 MMOL/L (ref 7–16)
APTT PPP: 32.2 SEC (ref 24.7–36)
AST SERPL-CCNC: 27 U/L (ref 12–45)
BASOPHILS # BLD AUTO: 0.5 % (ref 0–1.8)
BASOPHILS # BLD: 0.02 K/UL (ref 0–0.12)
BILIRUB SERPL-MCNC: 0.8 MG/DL (ref 0.1–1.5)
BUN SERPL-MCNC: 8 MG/DL (ref 8–22)
CALCIUM SERPL-MCNC: 9.7 MG/DL (ref 8.5–10.5)
CHLORIDE SERPL-SCNC: 104 MMOL/L (ref 96–112)
CHOLEST SERPL-MCNC: 140 MG/DL (ref 100–199)
CO2 SERPL-SCNC: 25 MMOL/L (ref 20–33)
CREAT SERPL-MCNC: 1.06 MG/DL (ref 0.5–1.4)
EOSINOPHIL # BLD AUTO: 0.11 K/UL (ref 0–0.51)
EOSINOPHIL NFR BLD: 2.8 % (ref 0–6.9)
ERYTHROCYTE [DISTWIDTH] IN BLOOD BY AUTOMATED COUNT: 44 FL (ref 35.9–50)
EST. AVERAGE GLUCOSE BLD GHB EST-MCNC: 105 MG/DL
FASTING STATUS PATIENT QL REPORTED: NORMAL
GLOBULIN SER CALC-MCNC: 2.3 G/DL (ref 1.9–3.5)
GLUCOSE SERPL-MCNC: 78 MG/DL (ref 65–99)
HBA1C MFR BLD: 5.3 % (ref 4–5.6)
HBV SURFACE AG SER QL: NORMAL
HCT VFR BLD AUTO: 47.7 % (ref 42–52)
HCV AB SER QL: NORMAL
HDLC SERPL-MCNC: 47 MG/DL
HGB BLD-MCNC: 15.7 G/DL (ref 14–18)
HIV 1+2 AB+HIV1 P24 AG SERPL QL IA: NORMAL
IMM GRANULOCYTES # BLD AUTO: 0 K/UL (ref 0–0.11)
IMM GRANULOCYTES NFR BLD AUTO: 0 % (ref 0–0.9)
INR PPP: 1.05 (ref 0.87–1.13)
LDLC SERPL CALC-MCNC: 83 MG/DL
LYMPHOCYTES # BLD AUTO: 1.36 K/UL (ref 1–4.8)
LYMPHOCYTES NFR BLD: 35 % (ref 22–41)
MCH RBC QN AUTO: 29.6 PG (ref 27–33)
MCHC RBC AUTO-ENTMCNC: 32.9 G/DL (ref 33.7–35.3)
MCV RBC AUTO: 90 FL (ref 81.4–97.8)
MONOCYTES # BLD AUTO: 0.38 K/UL (ref 0–0.85)
MONOCYTES NFR BLD AUTO: 9.8 % (ref 0–13.4)
NEUTROPHILS # BLD AUTO: 2.02 K/UL (ref 1.82–7.42)
NEUTROPHILS NFR BLD: 51.9 % (ref 44–72)
NRBC # BLD AUTO: 0 K/UL
NRBC BLD-RTO: 0 /100 WBC
PLATELET # BLD AUTO: 213 K/UL (ref 164–446)
PMV BLD AUTO: 11.1 FL (ref 9–12.9)
POTASSIUM SERPL-SCNC: 4.5 MMOL/L (ref 3.6–5.5)
PROT SERPL-MCNC: 7 G/DL (ref 6–8.2)
PROTHROMBIN TIME: 13.4 SEC (ref 12–14.6)
RBC # BLD AUTO: 5.3 M/UL (ref 4.7–6.1)
SODIUM SERPL-SCNC: 141 MMOL/L (ref 135–145)
TREPONEMA PALLIDUM IGG+IGM AB [PRESENCE] IN SERUM OR PLASMA BY IMMUNOASSAY: NORMAL
TRIGL SERPL-MCNC: 48 MG/DL (ref 0–149)
TSH SERPL DL<=0.005 MIU/L-ACNC: 1.95 UIU/ML (ref 0.38–5.33)
WBC # BLD AUTO: 3.9 K/UL (ref 4.8–10.8)

## 2022-02-18 PROCEDURE — 83036 HEMOGLOBIN GLYCOSYLATED A1C: CPT

## 2022-02-18 PROCEDURE — 87389 HIV-1 AG W/HIV-1&-2 AB AG IA: CPT

## 2022-02-18 PROCEDURE — 85025 COMPLETE CBC W/AUTO DIFF WBC: CPT

## 2022-02-18 PROCEDURE — 86696 HERPES SIMPLEX TYPE 2 TEST: CPT

## 2022-02-18 PROCEDURE — 84443 ASSAY THYROID STIM HORMONE: CPT

## 2022-02-18 PROCEDURE — 86803 HEPATITIS C AB TEST: CPT

## 2022-02-18 PROCEDURE — 86695 HERPES SIMPLEX TYPE 1 TEST: CPT

## 2022-02-18 PROCEDURE — 86780 TREPONEMA PALLIDUM: CPT

## 2022-02-18 PROCEDURE — 87491 CHLMYD TRACH DNA AMP PROBE: CPT

## 2022-02-18 PROCEDURE — 85610 PROTHROMBIN TIME: CPT

## 2022-02-18 PROCEDURE — 86694 HERPES SIMPLEX NES ANTBDY: CPT

## 2022-02-18 PROCEDURE — 36415 COLL VENOUS BLD VENIPUNCTURE: CPT

## 2022-02-18 PROCEDURE — 87340 HEPATITIS B SURFACE AG IA: CPT

## 2022-02-18 PROCEDURE — 87591 N.GONORRHOEAE DNA AMP PROB: CPT

## 2022-02-18 PROCEDURE — 80061 LIPID PANEL: CPT

## 2022-02-18 PROCEDURE — 80053 COMPREHEN METABOLIC PANEL: CPT

## 2022-02-18 PROCEDURE — 85730 THROMBOPLASTIN TIME PARTIAL: CPT

## 2022-02-22 LAB
HSV1 GG IGG SER-ACNC: 54.3 IV
HSV1+2 IGG SER IA-ACNC: >22.4 IV
HSV2 GG IGG SER-ACNC: 0.13 IV

## 2022-04-05 ENCOUNTER — OFFICE VISIT (OUTPATIENT)
Dept: MEDICAL GROUP | Facility: MEDICAL CENTER | Age: 32
End: 2022-04-05
Payer: COMMERCIAL

## 2022-04-05 VITALS
TEMPERATURE: 97 F | HEIGHT: 73 IN | SYSTOLIC BLOOD PRESSURE: 122 MMHG | OXYGEN SATURATION: 98 % | BODY MASS INDEX: 21.6 KG/M2 | WEIGHT: 163 LBS | DIASTOLIC BLOOD PRESSURE: 78 MMHG | HEART RATE: 66 BPM

## 2022-04-05 DIAGNOSIS — Z00.00 ANNUAL VISIT FOR GENERAL ADULT MEDICAL EXAMINATION WITHOUT ABNORMAL FINDINGS: ICD-10-CM

## 2022-04-05 PROCEDURE — 99385 PREV VISIT NEW AGE 18-39: CPT | Performed by: STUDENT IN AN ORGANIZED HEALTH CARE EDUCATION/TRAINING PROGRAM

## 2022-04-05 ASSESSMENT — FIBROSIS 4 INDEX: FIB4 SCORE: 0.68

## 2022-04-05 NOTE — PROGRESS NOTES
"Subjective:     CC:   Chief Complaint   Patient presents with   • Annual Exam       HPI:   Trung Pelayo is a 31 y.o. male who presents for annual exam    Last Colorectal Cancer Screening: Not indicated   Last Tdap: 2015  Received HPV series: Aged out  Hx STDs: Yes, 2 years ago    Exercise: moderate regular exercise, aerobic < 3 days a week  Diet: Eats at home mostly vegetables       He  has no past medical history on file.  He  has no past surgical history on file.    No family history on file.  Social History     Tobacco Use   • Smoking status: Never Smoker   • Smokeless tobacco: Never Used   Vaping Use   • Vaping Use: Never used   Substance Use Topics   • Alcohol use: No   • Drug use: No     He  has no history on file for sexual activity.    Patient Active Problem List    Diagnosis Date Noted   • Family history of STDs 02/16/2022   • Encounter for elective hair transplant 11/04/2019   • Vitamin D deficiency 11/28/2017     No current outpatient medications on file.     No current facility-administered medications for this visit.     No Known Allergies    Review of Systems  Constitutional: Negative for fever, chills and malaise/fatigue.   HENT: Negative for congestion.    Eyes: Negative for pain.   Respiratory: Negative for cough and shortness of breath.    Cardiovascular: Negative for chest pain and leg swelling.   Gastrointestinal: Negative for nausea, vomiting, abdominal pain and diarrhea.   Genitourinary: Negative for dysuria and hematuria.   Skin: Negative for rash.   Neurological: Negative for dizziness, focal weakness and headaches.   Endo/Heme/Allergies: Does not bruise/bleed easily.       Objective:   /78 (BP Location: Left arm, Patient Position: Sitting, BP Cuff Size: Adult)   Pulse 66   Temp 36.1 °C (97 °F) (Temporal)   Ht 1.854 m (6' 1\")   Wt 73.9 kg (163 lb)   SpO2 98%   BMI 21.51 kg/m²      Wt Readings from Last 4 Encounters:   04/05/22 73.9 kg (163 lb)   02/16/22 73.9 kg (163 lb) "   02/26/21 78.5 kg (173 lb)   02/11/21 78.5 kg (173 lb)           Physical Exam:  Constitutional: Well-developed and well-nourished. Not diaphoretic. No distress.   Skin: Skin is warm and dry. No rash noted.  Head: Atraumatic without lesions.  Eyes: Conjunctivae and extraocular motions are normal. Pupils are equal, round, and reactive to light. No scleral icterus.   Ears:  External ears unremarkable. Tympanic membranes clear and intact.  Nose: Nares patent. Septum midline. Turbinates without erythema nor edema. No discharge.   Mouth/Throat: Tongue normal. Oropharynx is clear and moist. Posterior pharynx without erythema or exudates.  Neck: Supple, trachea midline. Normal range of motion. No thyromegaly present. No lymphadenopathy--cervical or supraclavicular.  Cardiovascular: Regular rate and rhythm, S1 and S2 without murmur, rubs, or gallops.    Respiratory: Effort normal. Clear to auscultation throughout. No adventitious sounds.   Abdomen: Soft, non tender, and without distention. Active bowel sounds in all four quadrants. No rebound, guarding, masses or HSM.  Extremities: No cyanosis, clubbing, erythema, nor edema. Distal pulses intact and symmetric.   Musculoskeletal: All major joints AROM full in all directions without pain.  Neurological: Alert and oriented x 3. Grossly non-focal. Strength and sensation grossly intact.   Psychiatric:  Behavior, mood, and affect are appropriate.      Assessment and Plan:     There are no diagnoses linked to this encounter.    Health maintenance:  Uptodate  Labs per orders  Immunizations per orders  Patient counseled about skin care, diet, supplements, and exercise.  Discussed diet and exercise     Follow-up: No follow-ups on file.

## 2022-09-27 NOTE — TELEPHONE ENCOUNTER
Spoke to patient and informed him of negative Chlamydia results. He states he has noticed a small lump on the area that has returned recently. Will place referral to urology for further evaluation. He states understanding and appreciates the phone call.   What Type Of Note Output Would You Prefer (Optional)?: Bullet Format Is The Patient Presenting As Previously Scheduled?: No, they are a work-in How Severe Is Your Rash?: mild Is This A New Presentation, Or A Follow-Up?: Rash

## 2023-07-20 ENCOUNTER — HOSPITAL ENCOUNTER (OUTPATIENT)
Dept: LAB | Facility: MEDICAL CENTER | Age: 33
End: 2023-07-20
Attending: FAMILY MEDICINE
Payer: COMMERCIAL

## 2023-07-20 ENCOUNTER — OFFICE VISIT (OUTPATIENT)
Dept: MEDICAL GROUP | Facility: LAB | Age: 33
End: 2023-07-20
Payer: COMMERCIAL

## 2023-07-20 ENCOUNTER — HOSPITAL ENCOUNTER (OUTPATIENT)
Facility: MEDICAL CENTER | Age: 33
End: 2023-07-20
Attending: FAMILY MEDICINE
Payer: COMMERCIAL

## 2023-07-20 VITALS
RESPIRATION RATE: 12 BRPM | SYSTOLIC BLOOD PRESSURE: 106 MMHG | HEIGHT: 73 IN | OXYGEN SATURATION: 97 % | HEART RATE: 60 BPM | WEIGHT: 173.2 LBS | BODY MASS INDEX: 22.95 KG/M2 | TEMPERATURE: 98.1 F | DIASTOLIC BLOOD PRESSURE: 70 MMHG

## 2023-07-20 DIAGNOSIS — N45.1 EPIDIDYMITIS: ICD-10-CM

## 2023-07-20 DIAGNOSIS — Z11.3 SCREEN FOR STD (SEXUALLY TRANSMITTED DISEASE): ICD-10-CM

## 2023-07-20 LAB
HAV IGM SERPL QL IA: NORMAL
HBV CORE IGM SER QL: NORMAL
HBV SURFACE AG SER QL: NORMAL
HCV AB SER QL: NORMAL
HIV 1+2 AB+HIV1 P24 AG SERPL QL IA: NORMAL
T PALLIDUM AB SER QL IA: NORMAL

## 2023-07-20 PROCEDURE — 87389 HIV-1 AG W/HIV-1&-2 AB AG IA: CPT

## 2023-07-20 PROCEDURE — 87491 CHLMYD TRACH DNA AMP PROBE: CPT

## 2023-07-20 PROCEDURE — 99214 OFFICE O/P EST MOD 30 MIN: CPT | Performed by: FAMILY MEDICINE

## 2023-07-20 PROCEDURE — 3074F SYST BP LT 130 MM HG: CPT | Performed by: FAMILY MEDICINE

## 2023-07-20 PROCEDURE — 87591 N.GONORRHOEAE DNA AMP PROB: CPT

## 2023-07-20 PROCEDURE — 3078F DIAST BP <80 MM HG: CPT | Performed by: FAMILY MEDICINE

## 2023-07-20 PROCEDURE — 86780 TREPONEMA PALLIDUM: CPT

## 2023-07-20 PROCEDURE — 36415 COLL VENOUS BLD VENIPUNCTURE: CPT

## 2023-07-20 PROCEDURE — 80074 ACUTE HEPATITIS PANEL: CPT

## 2023-07-20 RX ORDER — SULFAMETHOXAZOLE AND TRIMETHOPRIM 800; 160 MG/1; MG/1
TABLET ORAL
COMMUNITY
End: 2023-07-20

## 2023-07-20 RX ORDER — NYSTATIN 100000 U/G
OINTMENT TOPICAL
COMMUNITY
End: 2023-07-20

## 2023-07-20 RX ORDER — ACYCLOVIR 50 MG/G
OINTMENT TOPICAL
COMMUNITY
End: 2023-07-20

## 2023-07-20 RX ORDER — TRIAMCINOLONE ACETONIDE 0.25 MG/G
OINTMENT TOPICAL
COMMUNITY
End: 2023-07-20

## 2023-07-20 RX ORDER — VALACYCLOVIR HYDROCHLORIDE 1 G/1
TABLET, FILM COATED ORAL
COMMUNITY
End: 2023-07-20

## 2023-07-20 RX ORDER — HYDROCORTISONE ACETATE 25 MG/1
SUPPOSITORY RECTAL
COMMUNITY
End: 2023-07-20

## 2023-07-20 RX ORDER — DOXYCYCLINE HYCLATE 100 MG
TABLET ORAL
COMMUNITY
End: 2023-07-20

## 2023-07-20 RX ORDER — PHENAZOPYRIDINE HYDROCHLORIDE 200 MG/1
TABLET, FILM COATED ORAL
COMMUNITY
End: 2023-07-20

## 2023-07-20 RX ORDER — DOXYCYCLINE HYCLATE 100 MG
100 TABLET ORAL 2 TIMES DAILY
Qty: 20 TABLET | Refills: 0 | Status: SHIPPED | OUTPATIENT
Start: 2023-07-20 | End: 2023-07-30

## 2023-07-20 ASSESSMENT — PATIENT HEALTH QUESTIONNAIRE - PHQ9: CLINICAL INTERPRETATION OF PHQ2 SCORE: 0

## 2023-07-20 ASSESSMENT — FIBROSIS 4 INDEX: FIB4 SCORE: 0.71

## 2023-07-20 NOTE — PROGRESS NOTES
"Subjective:     CC: STD screening, concern for epididymitis    HPI:   Trung presents today requesting STD screening and with concern for epididymitis.  STD screen.  History of chlamydia treated in 2020 as well as positive HSV titer.  Would like comprehensive screening due to new relationship but also concerned about bilateral testicular pain as well as lower abdominal pain that occurs after arousal.  No penile discharge, no genital rash, no dysuria.    Medications, past medical history, allergies, and social history have been reviewed and updated.      Objective:       Exam:  /70 (BP Location: Left arm, Patient Position: Sitting, BP Cuff Size: Adult)   Pulse 60   Temp 36.7 °C (98.1 °F)   Resp 12   Ht 1.854 m (6' 1\")   Wt 78.6 kg (173 lb 3.2 oz)   SpO2 97%   BMI 22.85 kg/m²  Body mass index is 22.85 kg/m².    Constitutional: Alert. Well appearing. No distress.  Skin: Warm, dry, good turgor, no visible rashes.  Genitalia: normal uncircumcised penis.  No obvious rash or lesions.  No penile discharge.  Bilateral testicles are normal, there is bilateral epididymal tenderness.  Psych: Answers questions appropriately. Normal affect and mood.      Assessment & Plan:     32 y.o. male with the following -     1. Epididymitis  Bilateral epididymal pain and tenderness, he is sexually active.  Covering with ceftriaxone and Doxy as below.  STD screening also ordered as below.  Follow-up if symptoms do not resolve.  - cefTRIAXone (Rocephin) 500 mg, lidocaine (Xylocaine) 1 % 2 mL for IM use  - doxycycline (VIBRAMYCIN) 100 MG Tab; Take 1 Tablet by mouth 2 times a day for 10 days.  Dispense: 20 Tablet; Refill: 0  - Chlamydia/GC, PCR (Urine); Future    2. Screen for STD (sexually transmitted disease)  - HIV AG/AB COMBO ASSAY SCREENING; Future  - T.PALLIDUM AB TONG (SCREENING); Future  - HEPATITIS PANEL ACUTE(4 COMPONENTS); Future  - Chlamydia/GC, PCR (Urine); Future      Please note that this note was created using voice " recognition software.

## 2023-08-07 ENCOUNTER — PATIENT MESSAGE (OUTPATIENT)
Dept: MEDICAL GROUP | Facility: LAB | Age: 33
End: 2023-08-07
Payer: COMMERCIAL

## 2024-04-20 ENCOUNTER — HOSPITAL ENCOUNTER (EMERGENCY)
Facility: MEDICAL CENTER | Age: 34
End: 2024-04-20
Attending: EMERGENCY MEDICINE
Payer: COMMERCIAL

## 2024-04-20 ENCOUNTER — APPOINTMENT (OUTPATIENT)
Dept: RADIOLOGY | Facility: MEDICAL CENTER | Age: 34
End: 2024-04-20
Attending: EMERGENCY MEDICINE
Payer: COMMERCIAL

## 2024-04-20 VITALS
HEIGHT: 73 IN | RESPIRATION RATE: 18 BRPM | HEART RATE: 88 BPM | BODY MASS INDEX: 23.37 KG/M2 | TEMPERATURE: 98.1 F | WEIGHT: 176.37 LBS | DIASTOLIC BLOOD PRESSURE: 90 MMHG | OXYGEN SATURATION: 98 % | SYSTOLIC BLOOD PRESSURE: 144 MMHG

## 2024-04-20 DIAGNOSIS — S52.501A CLOSED FRACTURE OF DISTAL END OF RIGHT RADIUS, UNSPECIFIED FRACTURE MORPHOLOGY, INITIAL ENCOUNTER: ICD-10-CM

## 2024-04-20 PROCEDURE — 29125 APPL SHORT ARM SPLINT STATIC: CPT

## 2024-04-20 PROCEDURE — 700101 HCHG RX REV CODE 250: Performed by: EMERGENCY MEDICINE

## 2024-04-20 PROCEDURE — 25605 CLTX DST RDL FX/EPHYS SEP W/: CPT

## 2024-04-20 PROCEDURE — 73110 X-RAY EXAM OF WRIST: CPT | Mod: RT

## 2024-04-20 PROCEDURE — 99284 EMERGENCY DEPT VISIT MOD MDM: CPT

## 2024-04-20 PROCEDURE — 302874 HCHG BANDAGE ACE 2 OR 3""

## 2024-04-20 RX ORDER — OXYCODONE HYDROCHLORIDE AND ACETAMINOPHEN 5; 325 MG/1; MG/1
1 TABLET ORAL EVERY 8 HOURS PRN
Qty: 12 TABLET | Refills: 0 | Status: SHIPPED | OUTPATIENT
Start: 2024-04-20 | End: 2024-04-24

## 2024-04-20 RX ORDER — IBUPROFEN 600 MG/1
600 TABLET ORAL EVERY 6 HOURS PRN
Qty: 60 TABLET | Refills: 0 | Status: SHIPPED | OUTPATIENT
Start: 2024-04-20

## 2024-04-20 RX ADMIN — LIDOCAINE HYDROCHLORIDE 20 ML: 10 INJECTION, SOLUTION INFILTRATION; PERINEURAL at 16:30

## 2024-04-20 NOTE — ED TRIAGE NOTES
"Chief Complaint   Patient presents with    Wrist Injury     Fall from approx 5ft after going off jump snowboarding. Reports fall onto R wrist. Denies head injury or LOC with fall. Denies other known injuries at this time. C&S intact.      Physical Exam  Pulmonary:      Effort: Pulmonary effort is normal.   Skin:     General: Skin is warm and dry.   Neurological:      Mental Status: He is alert.       BP (!) 137/96   Pulse 96   Temp 36.4 °C (97.6 °F) (Temporal)   Resp 18   Ht 1.854 m (6' 1\")   Wt 80 kg (176 lb 5.9 oz)   SpO2 99%   BMI 23.27 kg/m²     "

## 2024-04-21 NOTE — DISCHARGE INSTRUCTIONS
Referral has been sent to the [facility] for follow-up.  Dr. Tovar and is on-call today.  Please call the clinic on Monday to arrange an appointment for follow-up this upcoming week.

## 2024-04-21 NOTE — ED PROVIDER NOTES
"ED Provider Note    Primary care provider: Jasvir Marrufo D.O.  Means of arrival: POV  History obtained from: patient  History limited by: None    Obtained and reviewed past medical records.  Patient's last encounter was in the family medicine outpatient clinic he was screened for STDs and there was concern for epididymitis.  He was treated with doxycycline.    CHIEF COMPLAINT  Chief Complaint   Patient presents with    Wrist Injury     Fall from approx 5ft after going off jump snowboarding. Reports fall onto R wrist. Denies head injury or LOC with fall. Denies other known injuries at this time. C&S intact.        HPI  Trung Pelayo is a 33 y.o. male who presents to the Emergency Department with a chief complaint of a fall while snowboarding.  Patient states he was up about 5 feet, going off a jump when he fell on an outstretched hand.  He immediately noticed deformity and presented here for evaluation.  He denies any other past medical history.  No other injuries.  No LOC.  No neck pain.    PAST MEDICAL HISTORY   has a past medical history of Patient denies medical problems.    SURGICAL HISTORY  patient denies any surgical history    SOCIAL HISTORY  Social History     Tobacco Use    Smoking status: Never    Smokeless tobacco: Never   Vaping Use    Vaping Use: Never used   Substance Use Topics    Alcohol use: No    Drug use: No      Social History     Substance and Sexual Activity   Drug Use No       FAMILY HISTORY  No family history on file.    CURRENT MEDICATIONS  Home Medications       Reviewed by Graciela Dickinson R.N. (Registered Nurse) on 04/20/24 at 1223  Med List Status: Not Addressed     Medication Last Dose Status        Patient Moses Taking any Medications                           ALLERGIES  No Known Allergies    PHYSICAL EXAM  VITAL SIGNS: BP (!) 144/90   Pulse 88   Temp 36.7 °C (98.1 °F)   Resp 18   Ht 1.854 m (6' 1\")   Wt 80 kg (176 lb 5.9 oz)   SpO2 98%   BMI 23.27 kg/m²   Vitals " reviewed.  Constitutional: Patient is oriented to person, place, and time. Appears well-developed and well-nourished. Mild distress.    Head: Normocephalic and atraumatic.   Mouth/Throat: Oropharynx is clear.  Eyes: Conjunctivae are normal.   Neck: Normal range of motion. Neck supple.  Cardiovascular: Normal rate, regular rhythm and normal heart sounds. Normal peripheral pulses.  Pulmonary/Chest: Effort normal and breath sounds normal. No respiratory distress, no wheezes, rhonchi  Musculoskeletal: No edema. TTP distal right wrist with dorsal deformity, overlying skin intact  Neurological: No focal deficits.   Skin: Skin is warm and dry. No erythema. No pallor.   Psychiatric: Patient has a normal mood and affect.     RADIOLOGY  DX-WRIST-COMPLETE 3+ RIGHT   Final Result      Distal radius fracture.        The radiologist's interpretation of all radiological studies have been reviewed by me.    REDUCTION PROCEDURE NOTE:  Patient identification was confirmed, consent was obtained verbally.  This procedure was performed at bedside by Dr. Hobson.  Site: right wrist  Anesthetic used (type and amt): hematoma block  Pre-procedure N/V exam : NVI  # of attempts: 1  Type of splint: sugar tong  Pt anesthetized, fx/dislocation reduced. With marked improvement in dorsal displacment and improvement in patients pain.  Patient tolerated procedure well without complications. Patient splinted. Post-procedure exam indicates patient is n/v intact distal to the injury site. Post-procedure films show excellent alignment. Patient  returned to baseline prior to disposition. Instructions for care discussed verbally and patient provided with additional written instructions for homecare and f/u.    COURSE & MEDICAL DECISION MAKING    This is a pleasant and previously healthy 33-year-old male who presents after a snowboarding injury.  He has an obvious dorsal deformity of his right distal wrist.  X-rays obtained for further evaluation.    Patient  is reevaluated at the bedside.  We reviewed x-rays together.  I do think he would benefit from reduction of this distal radius fracture.  He is advised on follow-up with orthopedics.  He tolerated hematoma block well with significant improvement in his pain symptoms.  Marked improvement in the dorsal deformity prior to splinting.  Neurovascularly intact.  He is discharged home in stable condition referral placed to orthopedics.    FINAL IMPRESSION  1. Closed fracture of distal end of right radius, unspecified fracture morphology, initial encounter    Fracture reduction by Dr. Joce ADAMSON